# Patient Record
Sex: MALE | NOT HISPANIC OR LATINO | Employment: OTHER | URBAN - METROPOLITAN AREA
[De-identification: names, ages, dates, MRNs, and addresses within clinical notes are randomized per-mention and may not be internally consistent; named-entity substitution may affect disease eponyms.]

---

## 2017-06-20 ENCOUNTER — LAB (OUTPATIENT)
Dept: LAB | Facility: HOSPITAL | Age: 52
End: 2017-06-20

## 2017-06-20 ENCOUNTER — TRANSCRIBE ORDERS (OUTPATIENT)
Dept: LAB | Facility: HOSPITAL | Age: 52
End: 2017-06-20

## 2017-06-20 DIAGNOSIS — T84.89XA: ICD-10-CM

## 2017-06-20 DIAGNOSIS — T84.89XA: Primary | ICD-10-CM

## 2017-06-20 LAB
ALBUMIN SERPL-MCNC: 4 G/DL (ref 3.2–4.8)
ALBUMIN/GLOB SERPL: 1.3 G/DL (ref 1.5–2.5)
ALP SERPL-CCNC: 246 U/L (ref 25–100)
ALT SERPL W P-5'-P-CCNC: 75 U/L (ref 7–40)
ANION GAP SERPL CALCULATED.3IONS-SCNC: 8 MMOL/L (ref 3–11)
AST SERPL-CCNC: 73 U/L (ref 0–33)
BASOPHILS # BLD AUTO: 0.04 10*3/MM3 (ref 0–0.2)
BASOPHILS NFR BLD AUTO: 0.9 % (ref 0–1)
BILIRUB SERPL-MCNC: 0.4 MG/DL (ref 0.3–1.2)
BUN BLD-MCNC: 12 MG/DL (ref 9–23)
BUN/CREAT SERPL: 13.3 (ref 7–25)
CALCIUM SPEC-SCNC: 9.7 MG/DL (ref 8.7–10.4)
CHLORIDE SERPL-SCNC: 106 MMOL/L (ref 99–109)
CO2 SERPL-SCNC: 29 MMOL/L (ref 20–31)
CREAT BLD-MCNC: 0.9 MG/DL (ref 0.6–1.3)
CRP SERPL-MCNC: 0.22 MG/DL (ref 0–1)
DEPRECATED RDW RBC AUTO: 45.7 FL (ref 37–54)
EOSINOPHIL # BLD AUTO: 0.21 10*3/MM3 (ref 0.1–0.3)
EOSINOPHIL NFR BLD AUTO: 4.6 % (ref 0–3)
ERYTHROCYTE [DISTWIDTH] IN BLOOD BY AUTOMATED COUNT: 12.6 % (ref 11.3–14.5)
ERYTHROCYTE [SEDIMENTATION RATE] IN BLOOD: 18 MM/HR (ref 0–20)
GFR SERPL CREATININE-BSD FRML MDRD: 107 ML/MIN/1.73
GFR SERPL CREATININE-BSD FRML MDRD: 88 ML/MIN/1.73
GLOBULIN UR ELPH-MCNC: 3.2 GM/DL
GLUCOSE BLD-MCNC: 108 MG/DL (ref 70–100)
HCT VFR BLD AUTO: 45 % (ref 38.9–50.9)
HGB BLD-MCNC: 14.7 G/DL (ref 13.1–17.5)
IMM GRANULOCYTES # BLD: 0.01 10*3/MM3 (ref 0–0.03)
IMM GRANULOCYTES NFR BLD: 0.2 % (ref 0–0.6)
LYMPHOCYTES # BLD AUTO: 1.96 10*3/MM3 (ref 0.6–4.8)
LYMPHOCYTES NFR BLD AUTO: 43.1 % (ref 24–44)
MCH RBC QN AUTO: 32.2 PG (ref 27–31)
MCHC RBC AUTO-ENTMCNC: 32.7 G/DL (ref 32–36)
MCV RBC AUTO: 98.7 FL (ref 80–99)
MONOCYTES # BLD AUTO: 0.5 10*3/MM3 (ref 0–1)
MONOCYTES NFR BLD AUTO: 11 % (ref 0–12)
NEUTROPHILS # BLD AUTO: 1.83 10*3/MM3 (ref 1.5–8.3)
NEUTROPHILS NFR BLD AUTO: 40.2 % (ref 41–71)
PLATELET # BLD AUTO: 177 10*3/MM3 (ref 150–450)
PMV BLD AUTO: 11.8 FL (ref 6–12)
POTASSIUM BLD-SCNC: 4.6 MMOL/L (ref 3.5–5.5)
PROT SERPL-MCNC: 7.2 G/DL (ref 5.7–8.2)
RBC # BLD AUTO: 4.56 10*6/MM3 (ref 4.2–5.76)
SODIUM BLD-SCNC: 143 MMOL/L (ref 132–146)
WBC NRBC COR # BLD: 4.55 10*3/MM3 (ref 3.5–10.8)

## 2017-06-20 PROCEDURE — 36415 COLL VENOUS BLD VENIPUNCTURE: CPT

## 2017-06-20 PROCEDURE — 80053 COMPREHEN METABOLIC PANEL: CPT | Performed by: ORTHOPAEDIC SURGERY

## 2017-06-20 PROCEDURE — 82495 ASSAY OF CHROMIUM: CPT | Performed by: ORTHOPAEDIC SURGERY

## 2017-06-20 PROCEDURE — 85652 RBC SED RATE AUTOMATED: CPT | Performed by: ORTHOPAEDIC SURGERY

## 2017-06-20 PROCEDURE — 85025 COMPLETE CBC W/AUTO DIFF WBC: CPT | Performed by: ORTHOPAEDIC SURGERY

## 2017-06-20 PROCEDURE — 83018 HEAVY METAL QUAN EACH NES: CPT | Performed by: ORTHOPAEDIC SURGERY

## 2017-06-20 PROCEDURE — 86140 C-REACTIVE PROTEIN: CPT | Performed by: ORTHOPAEDIC SURGERY

## 2017-06-22 LAB — COBALT SERPL-MCNC: 3.4 UG/L (ref 0–0.9)

## 2017-06-23 LAB — CR SERPL-MCNC: 5.1 UG/L (ref 0.1–2.1)

## 2017-08-29 ENCOUNTER — HOSPITAL ENCOUNTER (OUTPATIENT)
Dept: GENERAL RADIOLOGY | Facility: HOSPITAL | Age: 52
Discharge: HOME | End: 2017-08-29
Admitting: ORTHOPAEDIC SURGERY

## 2017-08-29 ENCOUNTER — APPOINTMENT (OUTPATIENT)
Dept: PREADMISSION TESTING | Facility: HOSPITAL | Age: 52
End: 2017-08-29

## 2017-08-29 VITALS — BODY MASS INDEX: 32.52 KG/M2 | HEIGHT: 64 IN | WEIGHT: 190.48 LBS

## 2017-08-29 DIAGNOSIS — Z01.89 LABORATORY TEST: Primary | ICD-10-CM

## 2017-08-29 LAB
ABO GROUP BLD: NORMAL
ANION GAP SERPL CALCULATED.3IONS-SCNC: 6 MMOL/L (ref 3–11)
BUN BLD-MCNC: 17 MG/DL (ref 9–23)
BUN/CREAT SERPL: 21.3 (ref 7–25)
CALCIUM SPEC-SCNC: 9.3 MG/DL (ref 8.7–10.4)
CHLORIDE SERPL-SCNC: 107 MMOL/L (ref 99–109)
CO2 SERPL-SCNC: 28 MMOL/L (ref 20–31)
CREAT BLD-MCNC: 0.8 MG/DL (ref 0.6–1.3)
DEPRECATED RDW RBC AUTO: 48.2 FL (ref 37–54)
ERYTHROCYTE [DISTWIDTH] IN BLOOD BY AUTOMATED COUNT: 13.5 % (ref 11.3–14.5)
GFR SERPL CREATININE-BSD FRML MDRD: 101 ML/MIN/1.73
GFR SERPL CREATININE-BSD FRML MDRD: 123 ML/MIN/1.73
GLUCOSE BLD-MCNC: 137 MG/DL (ref 70–100)
HBA1C MFR BLD: 6 % (ref 4.8–5.6)
HCT VFR BLD AUTO: 42.6 % (ref 38.9–50.9)
HGB BLD-MCNC: 14.4 G/DL (ref 13.1–17.5)
MCH RBC QN AUTO: 33 PG (ref 27–31)
MCHC RBC AUTO-ENTMCNC: 33.8 G/DL (ref 32–36)
MCV RBC AUTO: 97.5 FL (ref 80–99)
PLATELET # BLD AUTO: 199 10*3/MM3 (ref 150–450)
PMV BLD AUTO: 10.8 FL (ref 6–12)
POTASSIUM BLD-SCNC: 4 MMOL/L (ref 3.5–5.5)
RBC # BLD AUTO: 4.37 10*6/MM3 (ref 4.2–5.76)
RH BLD: POSITIVE
SODIUM BLD-SCNC: 141 MMOL/L (ref 132–146)
WBC NRBC COR # BLD: 6.22 10*3/MM3 (ref 3.5–10.8)

## 2017-08-29 PROCEDURE — 93005 ELECTROCARDIOGRAM TRACING: CPT

## 2017-08-29 PROCEDURE — 83036 HEMOGLOBIN GLYCOSYLATED A1C: CPT | Performed by: ORTHOPAEDIC SURGERY

## 2017-08-29 PROCEDURE — 36415 COLL VENOUS BLD VENIPUNCTURE: CPT

## 2017-08-29 PROCEDURE — 85027 COMPLETE CBC AUTOMATED: CPT | Performed by: ORTHOPAEDIC SURGERY

## 2017-08-29 PROCEDURE — 93010 ELECTROCARDIOGRAM REPORT: CPT | Performed by: INTERNAL MEDICINE

## 2017-08-29 PROCEDURE — 86901 BLOOD TYPING SEROLOGIC RH(D): CPT

## 2017-08-29 PROCEDURE — 80048 BASIC METABOLIC PNL TOTAL CA: CPT | Performed by: ORTHOPAEDIC SURGERY

## 2017-08-29 PROCEDURE — 86900 BLOOD TYPING SEROLOGIC ABO: CPT

## 2017-08-29 PROCEDURE — 71020 HC CHEST PA AND LATERAL: CPT

## 2017-08-29 RX ORDER — HYDROCODONE BITARTRATE AND ACETAMINOPHEN 7.5; 325 MG/1; MG/1
1 TABLET ORAL 2 TIMES DAILY PRN
COMMUNITY
End: 2017-09-12 | Stop reason: HOSPADM

## 2017-09-10 ENCOUNTER — ANESTHESIA EVENT (OUTPATIENT)
Dept: PERIOP | Facility: HOSPITAL | Age: 52
End: 2017-09-10

## 2017-09-11 ENCOUNTER — ANESTHESIA (OUTPATIENT)
Dept: PERIOP | Facility: HOSPITAL | Age: 52
End: 2017-09-11

## 2017-09-11 ENCOUNTER — APPOINTMENT (OUTPATIENT)
Dept: GENERAL RADIOLOGY | Facility: HOSPITAL | Age: 52
End: 2017-09-11

## 2017-09-11 ENCOUNTER — HOSPITAL ENCOUNTER (INPATIENT)
Facility: HOSPITAL | Age: 52
LOS: 1 days | Discharge: HOME HEALTH | End: 2017-09-12
Attending: ORTHOPAEDIC SURGERY | Admitting: ORTHOPAEDIC SURGERY

## 2017-09-11 DIAGNOSIS — Z78.9 IMPAIRED MOBILITY AND ADLS: ICD-10-CM

## 2017-09-11 DIAGNOSIS — Z74.09 IMPAIRED MOBILITY AND ADLS: ICD-10-CM

## 2017-09-11 DIAGNOSIS — M25.551 HIP PAIN, RIGHT: ICD-10-CM

## 2017-09-11 DIAGNOSIS — Z74.09 IMPAIRED FUNCTIONAL MOBILITY, BALANCE, GAIT, AND ENDURANCE: Primary | ICD-10-CM

## 2017-09-11 DIAGNOSIS — Z96.641 STATUS POST TOTAL REPLACEMENT OF RIGHT HIP: ICD-10-CM

## 2017-09-11 PROBLEM — K21.9 GERD (GASTROESOPHAGEAL REFLUX DISEASE): Status: ACTIVE | Noted: 2017-09-11

## 2017-09-11 PROBLEM — Z96.649 PAIN DUE TO TOTAL HIP REPLACEMENT (HCC): Status: ACTIVE | Noted: 2017-09-11

## 2017-09-11 PROBLEM — Z72.0 TOBACCO ABUSE: Status: ACTIVE | Noted: 2017-09-11

## 2017-09-11 PROBLEM — R73.03 PREDIABETES: Status: ACTIVE | Noted: 2017-09-11

## 2017-09-11 PROBLEM — T84.84XA PAIN DUE TO TOTAL HIP REPLACEMENT (HCC): Status: ACTIVE | Noted: 2017-09-11

## 2017-09-11 LAB
ABO GROUP BLD: NORMAL
BLD GP AB SCN SERPL QL: NEGATIVE
GLUCOSE BLDC GLUCOMTR-MCNC: 105 MG/DL (ref 70–130)
GLUCOSE BLDC GLUCOMTR-MCNC: 126 MG/DL (ref 70–130)
GLUCOSE BLDC GLUCOMTR-MCNC: 161 MG/DL (ref 70–130)
POTASSIUM BLDA-SCNC: 3.94 MMOL/L (ref 3.5–5.3)
RH BLD: POSITIVE

## 2017-09-11 PROCEDURE — 87176 TISSUE HOMOGENIZATION CULTR: CPT | Performed by: ORTHOPAEDIC SURGERY

## 2017-09-11 PROCEDURE — 84132 ASSAY OF SERUM POTASSIUM: CPT | Performed by: ANESTHESIOLOGY

## 2017-09-11 PROCEDURE — 86850 RBC ANTIBODY SCREEN: CPT | Performed by: ORTHOPAEDIC SURGERY

## 2017-09-11 PROCEDURE — 82962 GLUCOSE BLOOD TEST: CPT

## 2017-09-11 PROCEDURE — 97161 PT EVAL LOW COMPLEX 20 MIN: CPT

## 2017-09-11 PROCEDURE — 0SPA0JZ REMOVAL OF SYNTHETIC SUBSTITUTE FROM RIGHT HIP JOINT, ACETABULAR SURFACE, OPEN APPROACH: ICD-10-PCS | Performed by: ORTHOPAEDIC SURGERY

## 2017-09-11 PROCEDURE — 97116 GAIT TRAINING THERAPY: CPT

## 2017-09-11 PROCEDURE — 25010000002 ROPIVACAINE PER 1 MG: Performed by: ORTHOPAEDIC SURGERY

## 2017-09-11 PROCEDURE — 25010000002 HYDROMORPHONE PER 4 MG: Performed by: INTERNAL MEDICINE

## 2017-09-11 PROCEDURE — 25010000002 FENTANYL CITRATE (PF) 100 MCG/2ML SOLUTION: Performed by: NURSE ANESTHETIST, CERTIFIED REGISTERED

## 2017-09-11 PROCEDURE — 87075 CULTR BACTERIA EXCEPT BLOOD: CPT | Performed by: ORTHOPAEDIC SURGERY

## 2017-09-11 PROCEDURE — 25010000003 CEFAZOLIN IN DEXTROSE 2-4 GM/100ML-% SOLUTION: Performed by: ORTHOPAEDIC SURGERY

## 2017-09-11 PROCEDURE — 25010000002 EPINEPHRINE PER 0.1 MG: Performed by: ORTHOPAEDIC SURGERY

## 2017-09-11 PROCEDURE — 87070 CULTURE OTHR SPECIMN AEROBIC: CPT | Performed by: ORTHOPAEDIC SURGERY

## 2017-09-11 PROCEDURE — 25010000002 PROPOFOL 1000 MG/ML EMULSION: Performed by: NURSE ANESTHETIST, CERTIFIED REGISTERED

## 2017-09-11 PROCEDURE — 87116 MYCOBACTERIA CULTURE: CPT | Performed by: ORTHOPAEDIC SURGERY

## 2017-09-11 PROCEDURE — 87205 SMEAR GRAM STAIN: CPT | Performed by: ORTHOPAEDIC SURGERY

## 2017-09-11 PROCEDURE — 73502 X-RAY EXAM HIP UNI 2-3 VIEWS: CPT

## 2017-09-11 PROCEDURE — 87206 SMEAR FLUORESCENT/ACID STAI: CPT | Performed by: ORTHOPAEDIC SURGERY

## 2017-09-11 PROCEDURE — 86923 COMPATIBILITY TEST ELECTRIC: CPT

## 2017-09-11 PROCEDURE — C1776 JOINT DEVICE (IMPLANTABLE): HCPCS | Performed by: ORTHOPAEDIC SURGERY

## 2017-09-11 PROCEDURE — 0SRA00Z REPLACEMENT OF RIGHT HIP JOINT, ACETABULAR SURFACE WITH POLYETHYLENE SYNTHETIC SUBSTITUTE, OPEN APPROACH: ICD-10-PCS | Performed by: ORTHOPAEDIC SURGERY

## 2017-09-11 PROCEDURE — 25010000002 ONDANSETRON PER 1 MG: Performed by: NURSE PRACTITIONER

## 2017-09-11 PROCEDURE — 86901 BLOOD TYPING SEROLOGIC RH(D): CPT | Performed by: ORTHOPAEDIC SURGERY

## 2017-09-11 PROCEDURE — 87102 FUNGUS ISOLATION CULTURE: CPT | Performed by: ORTHOPAEDIC SURGERY

## 2017-09-11 PROCEDURE — 25010000002 KETOROLAC TROMETHAMINE PER 15 MG: Performed by: ORTHOPAEDIC SURGERY

## 2017-09-11 PROCEDURE — 86900 BLOOD TYPING SEROLOGIC ABO: CPT | Performed by: ORTHOPAEDIC SURGERY

## 2017-09-11 PROCEDURE — G0108 DIAB MANAGE TRN  PER INDIV: HCPCS

## 2017-09-11 DEVICE — M-SPEC METAL FEMORAL HEAD 12/14 TAPER DIAMETER 36MM +1.5: Type: IMPLANTABLE DEVICE | Status: FUNCTIONAL

## 2017-09-11 DEVICE — PINNACLE HIP SOLUTIONS ALTRX POLYETHYLENE ACETABULAR LINER NEUTRAL 36MM ID 54MM OD
Type: IMPLANTABLE DEVICE | Status: FUNCTIONAL
Brand: PINNACLE ALTRX

## 2017-09-11 RX ORDER — SODIUM CHLORIDE 0.9 % (FLUSH) 0.9 %
1-10 SYRINGE (ML) INJECTION AS NEEDED
Status: DISCONTINUED | OUTPATIENT
Start: 2017-09-11 | End: 2017-09-12 | Stop reason: HOSPADM

## 2017-09-11 RX ORDER — FAMOTIDINE 10 MG/ML
20 INJECTION, SOLUTION INTRAVENOUS ONCE
Status: DISCONTINUED | OUTPATIENT
Start: 2017-09-11 | End: 2017-09-11 | Stop reason: HOSPADM

## 2017-09-11 RX ORDER — HYDROMORPHONE HYDROCHLORIDE 1 MG/ML
0.5 INJECTION, SOLUTION INTRAMUSCULAR; INTRAVENOUS; SUBCUTANEOUS
Status: DISCONTINUED | OUTPATIENT
Start: 2017-09-11 | End: 2017-09-12 | Stop reason: HOSPADM

## 2017-09-11 RX ORDER — ONDANSETRON 2 MG/ML
4 INJECTION INTRAMUSCULAR; INTRAVENOUS EVERY 6 HOURS PRN
Status: DISCONTINUED | OUTPATIENT
Start: 2017-09-11 | End: 2017-09-12 | Stop reason: HOSPADM

## 2017-09-11 RX ORDER — FAMOTIDINE 20 MG/1
20 TABLET, FILM COATED ORAL ONCE
Status: COMPLETED | OUTPATIENT
Start: 2017-09-11 | End: 2017-09-11

## 2017-09-11 RX ORDER — CEFAZOLIN SODIUM 2 G/100ML
2 INJECTION, SOLUTION INTRAVENOUS ONCE
Status: COMPLETED | OUTPATIENT
Start: 2017-09-11 | End: 2017-09-11

## 2017-09-11 RX ORDER — DEXTROSE MONOHYDRATE 25 G/50ML
25 INJECTION, SOLUTION INTRAVENOUS
Status: DISCONTINUED | OUTPATIENT
Start: 2017-09-11 | End: 2017-09-12 | Stop reason: HOSPADM

## 2017-09-11 RX ORDER — NICOTINE POLACRILEX 4 MG
15 LOZENGE BUCCAL
Status: DISCONTINUED | OUTPATIENT
Start: 2017-09-11 | End: 2017-09-12 | Stop reason: HOSPADM

## 2017-09-11 RX ORDER — LIDOCAINE HYDROCHLORIDE 10 MG/ML
INJECTION, SOLUTION INFILTRATION; PERINEURAL AS NEEDED
Status: DISCONTINUED | OUTPATIENT
Start: 2017-09-11 | End: 2017-09-11 | Stop reason: SURG

## 2017-09-11 RX ORDER — LIDOCAINE HYDROCHLORIDE 10 MG/ML
0.5 INJECTION, SOLUTION EPIDURAL; INFILTRATION; INTRACAUDAL; PERINEURAL ONCE AS NEEDED
Status: DISCONTINUED | OUTPATIENT
Start: 2017-09-11 | End: 2017-09-11 | Stop reason: HOSPADM

## 2017-09-11 RX ORDER — FENTANYL CITRATE 50 UG/ML
50 INJECTION, SOLUTION INTRAMUSCULAR; INTRAVENOUS
Status: DISCONTINUED | OUTPATIENT
Start: 2017-09-11 | End: 2017-09-11 | Stop reason: HOSPADM

## 2017-09-11 RX ORDER — DOCUSATE SODIUM 100 MG/1
100 CAPSULE, LIQUID FILLED ORAL 2 TIMES DAILY
Status: DISCONTINUED | OUTPATIENT
Start: 2017-09-11 | End: 2017-09-12 | Stop reason: HOSPADM

## 2017-09-11 RX ORDER — MAGNESIUM HYDROXIDE 1200 MG/15ML
LIQUID ORAL AS NEEDED
Status: DISCONTINUED | OUTPATIENT
Start: 2017-09-11 | End: 2017-09-11 | Stop reason: HOSPADM

## 2017-09-11 RX ORDER — ROPIVACAINE HYDROCHLORIDE 5 MG/ML
INJECTION, SOLUTION EPIDURAL; INFILTRATION; PERINEURAL AS NEEDED
Status: DISCONTINUED | OUTPATIENT
Start: 2017-09-11 | End: 2017-09-11 | Stop reason: HOSPADM

## 2017-09-11 RX ORDER — LIDOCAINE HYDROCHLORIDE 10 MG/ML
0.5 INJECTION, SOLUTION EPIDURAL; INFILTRATION; INTRACAUDAL; PERINEURAL ONCE AS NEEDED
Status: COMPLETED | OUTPATIENT
Start: 2017-09-11 | End: 2017-09-11

## 2017-09-11 RX ORDER — OXYCODONE HYDROCHLORIDE AND ACETAMINOPHEN 5; 325 MG/1; MG/1
1 TABLET ORAL EVERY 4 HOURS PRN
Status: DISCONTINUED | OUTPATIENT
Start: 2017-09-11 | End: 2017-09-12 | Stop reason: HOSPADM

## 2017-09-11 RX ORDER — OXYCODONE HYDROCHLORIDE AND ACETAMINOPHEN 5; 325 MG/1; MG/1
2 TABLET ORAL EVERY 4 HOURS PRN
Status: DISCONTINUED | OUTPATIENT
Start: 2017-09-11 | End: 2017-09-12 | Stop reason: HOSPADM

## 2017-09-11 RX ORDER — TRANEXAMIC ACID 100 MG/ML
10 INJECTION, SOLUTION INTRAVENOUS ONCE
Status: DISCONTINUED | OUTPATIENT
Start: 2017-09-11 | End: 2017-09-11 | Stop reason: HOSPADM

## 2017-09-11 RX ORDER — SODIUM CHLORIDE, SODIUM LACTATE, POTASSIUM CHLORIDE, CALCIUM CHLORIDE 600; 310; 30; 20 MG/100ML; MG/100ML; MG/100ML; MG/100ML
9 INJECTION, SOLUTION INTRAVENOUS CONTINUOUS
Status: DISCONTINUED | OUTPATIENT
Start: 2017-09-11 | End: 2017-09-12 | Stop reason: HOSPADM

## 2017-09-11 RX ORDER — HYDROMORPHONE HYDROCHLORIDE 1 MG/ML
0.5 INJECTION, SOLUTION INTRAMUSCULAR; INTRAVENOUS; SUBCUTANEOUS
Status: DISCONTINUED | OUTPATIENT
Start: 2017-09-11 | End: 2017-09-11 | Stop reason: HOSPADM

## 2017-09-11 RX ORDER — CEFAZOLIN SODIUM 2 G/100ML
2 INJECTION, SOLUTION INTRAVENOUS EVERY 8 HOURS
Status: COMPLETED | OUTPATIENT
Start: 2017-09-11 | End: 2017-09-12

## 2017-09-11 RX ORDER — ASPIRIN 325 MG
325 TABLET, DELAYED RELEASE (ENTERIC COATED) ORAL DAILY
Status: DISCONTINUED | OUTPATIENT
Start: 2017-09-12 | End: 2017-09-12 | Stop reason: HOSPADM

## 2017-09-11 RX ORDER — SODIUM CHLORIDE 9 MG/ML
150 INJECTION, SOLUTION INTRAVENOUS CONTINUOUS
Status: DISCONTINUED | OUTPATIENT
Start: 2017-09-11 | End: 2017-09-12 | Stop reason: HOSPADM

## 2017-09-11 RX ORDER — ACETAMINOPHEN 325 MG/1
650 TABLET ORAL EVERY 4 HOURS PRN
Status: DISCONTINUED | OUTPATIENT
Start: 2017-09-11 | End: 2017-09-12 | Stop reason: HOSPADM

## 2017-09-11 RX ORDER — BUPIVACAINE HYDROCHLORIDE 5 MG/ML
INJECTION, SOLUTION EPIDURAL; INTRACAUDAL AS NEEDED
Status: DISCONTINUED | OUTPATIENT
Start: 2017-09-11 | End: 2017-09-11 | Stop reason: SURG

## 2017-09-11 RX ORDER — FAMOTIDINE 20 MG/1
20 TABLET, FILM COATED ORAL ONCE
Status: DISCONTINUED | OUTPATIENT
Start: 2017-09-11 | End: 2017-09-11 | Stop reason: HOSPADM

## 2017-09-11 RX ORDER — SODIUM CHLORIDE 0.9 % (FLUSH) 0.9 %
1-10 SYRINGE (ML) INJECTION AS NEEDED
Status: DISCONTINUED | OUTPATIENT
Start: 2017-09-11 | End: 2017-09-11 | Stop reason: HOSPADM

## 2017-09-11 RX ORDER — HYDROCODONE BITARTRATE AND ACETAMINOPHEN 5; 325 MG/1; MG/1
1 TABLET ORAL EVERY 4 HOURS PRN
Status: DISCONTINUED | OUTPATIENT
Start: 2017-09-11 | End: 2017-09-11

## 2017-09-11 RX ORDER — FENTANYL CITRATE 50 UG/ML
INJECTION, SOLUTION INTRAMUSCULAR; INTRAVENOUS AS NEEDED
Status: DISCONTINUED | OUTPATIENT
Start: 2017-09-11 | End: 2017-09-11 | Stop reason: SURG

## 2017-09-11 RX ORDER — PANTOPRAZOLE SODIUM 40 MG/1
40 TABLET, DELAYED RELEASE ORAL DAILY
Status: DISCONTINUED | OUTPATIENT
Start: 2017-09-11 | End: 2017-09-12 | Stop reason: HOSPADM

## 2017-09-11 RX ORDER — KETOROLAC TROMETHAMINE 15 MG/ML
15 INJECTION, SOLUTION INTRAMUSCULAR; INTRAVENOUS EVERY 6 HOURS PRN
Status: DISCONTINUED | OUTPATIENT
Start: 2017-09-11 | End: 2017-09-12 | Stop reason: HOSPADM

## 2017-09-11 RX ORDER — BISACODYL 5 MG/1
10 TABLET, DELAYED RELEASE ORAL DAILY PRN
Status: DISCONTINUED | OUTPATIENT
Start: 2017-09-11 | End: 2017-09-12 | Stop reason: HOSPADM

## 2017-09-11 RX ORDER — NICOTINE 21 MG/24HR
1 PATCH, TRANSDERMAL 24 HOURS TRANSDERMAL EVERY 24 HOURS
Status: DISCONTINUED | OUTPATIENT
Start: 2017-09-11 | End: 2017-09-12 | Stop reason: HOSPADM

## 2017-09-11 RX ORDER — BISACODYL 10 MG
10 SUPPOSITORY, RECTAL RECTAL DAILY PRN
Status: DISCONTINUED | OUTPATIENT
Start: 2017-09-11 | End: 2017-09-12 | Stop reason: HOSPADM

## 2017-09-11 RX ORDER — PANTOPRAZOLE SODIUM 20 MG/1
20 TABLET, DELAYED RELEASE ORAL DAILY
COMMUNITY

## 2017-09-11 RX ORDER — HYDRALAZINE HYDROCHLORIDE 20 MG/ML
10 INJECTION INTRAMUSCULAR; INTRAVENOUS EVERY 6 HOURS PRN
Status: DISCONTINUED | OUTPATIENT
Start: 2017-09-11 | End: 2017-09-12 | Stop reason: HOSPADM

## 2017-09-11 RX ORDER — ONDANSETRON 2 MG/ML
4 INJECTION INTRAMUSCULAR; INTRAVENOUS ONCE AS NEEDED
Status: DISCONTINUED | OUTPATIENT
Start: 2017-09-11 | End: 2017-09-11 | Stop reason: HOSPADM

## 2017-09-11 RX ADMIN — LIDOCAINE HYDROCHLORIDE 50 MG: 10 INJECTION, SOLUTION INFILTRATION; PERINEURAL at 10:35

## 2017-09-11 RX ADMIN — DOCUSATE SODIUM 100 MG: 100 CAPSULE, LIQUID FILLED ORAL at 17:33

## 2017-09-11 RX ADMIN — FENTANYL CITRATE 100 MCG: 50 INJECTION, SOLUTION INTRAMUSCULAR; INTRAVENOUS at 10:25

## 2017-09-11 RX ADMIN — SODIUM CHLORIDE, POTASSIUM CHLORIDE, SODIUM LACTATE AND CALCIUM CHLORIDE 9 ML/HR: 600; 310; 30; 20 INJECTION, SOLUTION INTRAVENOUS at 08:07

## 2017-09-11 RX ADMIN — HYDROCODONE BITARTRATE AND ACETAMINOPHEN 1 TABLET: 5; 325 TABLET ORAL at 14:26

## 2017-09-11 RX ADMIN — LIDOCAINE HYDROCHLORIDE 0.5 ML: 10 INJECTION, SOLUTION EPIDURAL; INFILTRATION; INTRACAUDAL; PERINEURAL at 08:07

## 2017-09-11 RX ADMIN — CEFAZOLIN SODIUM 2 G: 2 INJECTION, SOLUTION INTRAVENOUS at 17:33

## 2017-09-11 RX ADMIN — HYDROMORPHONE HYDROCHLORIDE 0.5 MG: 1 INJECTION, SOLUTION INTRAMUSCULAR; INTRAVENOUS; SUBCUTANEOUS at 15:04

## 2017-09-11 RX ADMIN — SODIUM CHLORIDE 150 ML/HR: 9 INJECTION, SOLUTION INTRAVENOUS at 12:47

## 2017-09-11 RX ADMIN — SODIUM CHLORIDE 150 ML/HR: 9 INJECTION, SOLUTION INTRAVENOUS at 20:02

## 2017-09-11 RX ADMIN — KETOROLAC TROMETHAMINE 15 MG: 15 INJECTION, SOLUTION INTRAMUSCULAR; INTRAVENOUS at 16:01

## 2017-09-11 RX ADMIN — EPHEDRINE SULFATE 10 MG: 50 INJECTION INTRAMUSCULAR; INTRAVENOUS; SUBCUTANEOUS at 11:01

## 2017-09-11 RX ADMIN — OXYCODONE AND ACETAMINOPHEN 2 TABLET: 5; 325 TABLET ORAL at 22:08

## 2017-09-11 RX ADMIN — PROPOFOL 150 MCG/KG/MIN: 10 INJECTION, EMULSION INTRAVENOUS at 10:35

## 2017-09-11 RX ADMIN — CEFAZOLIN SODIUM 2 G: 2 INJECTION, SOLUTION INTRAVENOUS at 10:21

## 2017-09-11 RX ADMIN — OXYCODONE AND ACETAMINOPHEN 2 TABLET: 5; 325 TABLET ORAL at 17:38

## 2017-09-11 RX ADMIN — ONDANSETRON 4 MG: 2 INJECTION INTRAMUSCULAR; INTRAVENOUS at 15:42

## 2017-09-11 RX ADMIN — SODIUM CHLORIDE, POTASSIUM CHLORIDE, SODIUM LACTATE AND CALCIUM CHLORIDE: 600; 310; 30; 20 INJECTION, SOLUTION INTRAVENOUS at 10:20

## 2017-09-11 RX ADMIN — TRANEXAMIC ACID 864 MG: 100 INJECTION, SOLUTION INTRAVENOUS at 11:46

## 2017-09-11 RX ADMIN — HYDROMORPHONE HYDROCHLORIDE 0.5 MG: 1 INJECTION, SOLUTION INTRAMUSCULAR; INTRAVENOUS; SUBCUTANEOUS at 20:02

## 2017-09-11 RX ADMIN — BUPIVACAINE HYDROCHLORIDE 15 MG: 5 INJECTION, SOLUTION EPIDURAL; INTRACAUDAL; PERINEURAL at 10:30

## 2017-09-11 RX ADMIN — FAMOTIDINE 20 MG: 20 TABLET ORAL at 08:07

## 2017-09-11 RX ADMIN — EPHEDRINE SULFATE 10 MG: 50 INJECTION INTRAMUSCULAR; INTRAVENOUS; SUBCUTANEOUS at 11:26

## 2017-09-11 RX ADMIN — TRANEXAMIC ACID 864 MG: 100 INJECTION, SOLUTION INTRAVENOUS at 10:49

## 2017-09-11 NOTE — PLAN OF CARE
Problem: Patient Care Overview (Adult)  Goal: Plan of Care Review  Outcome: Ongoing (interventions implemented as appropriate)    09/11/17 1642   Coping/Psychosocial Response Interventions   Plan Of Care Reviewed With patient;spouse   Patient Care Overview   Progress no change   Outcome Evaluation   Outcome Summary/Follow up Plan 51 yo male admitted 9/11/17 for right hip revision. Issues w/ pain and nausea, using prn medication to resolve. Decreased sensation r/t spinal anesthesia causing some incontinence-resolving as sensation returns. Eager to work w/ therapy, wife present and very interactive in care. Anticipate home w/ family when medically ready.

## 2017-09-11 NOTE — THERAPY EVALUATION
Acute Care - Physical Therapy Initial Evaluation  [facility]     Patient Name: Ezekiel Reyes  : 1965  MRN: [id number]  Today's Date: 2017   Onset of Illness/Injury or Date of Surgery Date: 17  Date of Referral to PT: 17  Referring Physician: MD Silverio      Admit Date: 2017     Visit Dx:    ICD-10-CM ICD-9-CM   1. Impaired functional mobility, balance, gait, and endurance Z74.09 V49.89   2. Hip pain, right M25.551 719.45     Patient Active Problem List   Diagnosis   • Pain due to total hip replacement   • Status post revision total replacement of right hip   • GERD (gastroesophageal reflux disease)   • Prediabetes   • Tobacco abuse     Past Medical History:   Diagnosis Date   • Acid reflux    • Arthritis    • Wears dentures    • Wears glasses      Past Surgical History:   Procedure Laterality Date   • CARDIAC CATHETERIZATION     • CHOLECYSTECTOMY     • COLONOSCOPY     • JOINT REPLACEMENT Right     Trochanteric nailing then removal with Right FRANKO   • JOINT REPLACEMENT Left    • SHOULDER SURGERY            PT ASSESSMENT (last 72 hours)      PT Evaluation       17 1620 17 0809    Rehab Evaluation    Document Type evaluation  -LR     Subjective Information agree to therapy;complains of;pain  -LR     Patient Effort, Rehab Treatment excellent  -LR     Symptoms Noted During/After Treatment none  -LR     General Information    Patient Profile Review yes  -LR     Onset of Illness/Injury or Date of Surgery Date 17  -LR     Referring Physician MD Silverio  -LR     General Observations Patient supine in bed upon arrival. IV, SCDs. Wife present.   -LR     Pertinent History Of Current Problem Patient presents for surgical management of persistent and progressive R hip pain and dysfunction, s/p R FRANKO and intertrochanteric nail removal in .   -LR     Precautions/Limitations fall precautions;hip precautions- right;other (see comments)   impulsive  -LR     Prior Level of Function min  assist:;all household mobility;community mobility;gait;transfer;bed mobility;home management;cooking;cleaning;shopping;using stairs;independent:;driving;yard work   all mobility limited by pain  -LR     Equipment Currently Used at Home none  -LR none  -DB    Plans/Goals Discussed With patient;spouse/S.O.;agreed upon  -LR     Risks Reviewed patient:;spouse/S.O.:;LOB;nausea/vomiting;dizziness;increased discomfort;lines disloged  -LR     Benefits Reviewed patient:;spouse/S.O.:;improve function;increase independence;increase strength;increase balance;decrease pain;increase knowledge  -LR     Barriers to Rehab previous functional deficit  -LR     Living Environment    Lives With spouse;child(malena), dependent  -LR spouse;child(malena), dependent;child(malena), adult  -DB    Living Arrangements house  -LR house  -DB    Home Accessibility bed and bath on same level;house is not wheelchair accessible;stairs to enter home;tub/shower is not walk in  -LR no concerns  -DB    Number of Stairs to Enter Home 4   3 steps onto porch, 1 step into home  -LR 7  -DB    Number of Stairs Within Home --   to basement, does not have to access   -LR 15  -DB    Stair Railings at Home outside, present on right side  -LR outside, present at both sides  -DB    Type of Financial/Environmental Concern none  -LR none  -DB    Transportation Available family or friend will provide  -LR family or friend will provide;public transportation  -DB    Living Environment Comment Patient's wife available to assist for the first week, then has to return to work.   -LR na  -DB    Clinical Impression    Date of Referral to PT 09/11/17  -LR     PT Diagnosis s/p R FRANKO revision  -LR     Prognosis good  -LR     Patient/Family Goals Statement go home, decrease pain  -LR     Criteria for Skilled Therapeutic Interventions Met yes;treatment indicated  -LR     Rehab Potential good, to achieve stated therapy goals  -LR     Pain Assessment    Pain Assessment 0-10  -LR     Pain  Score 10  -LR     Post Pain Score 10  -LR     Pain Type Acute pain  -LR     Pain Location Hip  -LR     Pain Orientation Right  -LR     Pain Intervention(s) Repositioned;Ambulation/increased activity  -LR     Vision Assessment/Intervention    Visual Impairment WFL  -LR     Cognitive Assessment/Intervention    Current Cognitive/Communication Assessment functional  -LR     Orientation Status oriented x 4;required verbal cueing (specifiy in comments)  -LR     Follows Commands/Answers Questions 75% of the time;able to follow single-step instructions;needs cueing;needs repetition   decreased safety awareness.   -LR     Personal Safety mild impairment;at risk behaviors demonstrated;decreased awareness, need for assist;decreased awareness, need for safety;decreased insight to deficits;impulsive  -LR     ROM (Range of Motion)    General ROM lower extremity range of motion deficits identified  -LR     General LE Assessment    ROM LLE ROM was WFL;hip, right: LE ROM deficit  -LR     ROM Detail R hip AROM impaired 25%  -LR     MMT (Manual Muscle Testing)    General MMT Assessment lower extremity strength deficits identified  -LR     Lower Extremity    Lower Ext Manual Muscle Testing left LE strength is WFL;right hip strength deficit  -LR     Lower Ext Manual Muscle Testing Detail R hip functionally 4-/5  -LR     Mobility Assessment/Training    Extremity Weight-Bearing Status right lower extremity  -LR     Right Lower Extremity Weight-Bearing weight-bearing as tolerated  -LR     Bed Mobility, Assessment/Treatment    Bed Mobility, Assistive Device head of bed elevated;bed rails  -LR     Bed Mob, Supine to Sit, [place] supervision required  -LR     Bed Mob, Sit to Supine, [place] not tested   UIC at end of eval.   -LR     Bed Mobility, Impairments ROM decreased;strength decreased;pain  -LR     Transfer Assessment/Treatment    Transfers, Sit-Stand [place] verbal cues required;stand by assist  -LR     Transfers,  Stand-Sit [place] verbal cues required;stand by assist  -LR     Transfers, Sit-Stand-Sit, Assist Device rolling walker  -LR     Transfer, Safety Issues sequencing ability decreased;step length decreased;weight-shifting ability decreased  -LR     Transfer, Impairments ROM decreased;strength decreased;pain  -LR     Transfer, Comment Verbal cues for correct hand placement with t/f and to step L LE out before t/f for comfort.   -LR     Gait Assessment/Treatment    Gait, [place] Level verbal cues required;contact guard assist;2 person assist required  -LR     Gait, Assistive Device rolling walker  -LR     Gait, Distance (Feet) 500  -LR     Gait, Gait Pattern Analysis swing-through gait  -LR     Gait, Gait Deviations right:;antalgic;toe-to-floor clearance decreased  -LR     Gait, Safety Issues weight-shifting ability decreased  -LR     Gait, Impairments ROM decreased;strength decreased;pain  -LR     Gait, Comment Patient ambulated with step through gait pattern at fast pace. Verbal cues for increased R knee flexion during swing phase. Improved with cues for correction. Gait limited by pain.   -LR     Therapy Exercises    Exercise Protocols total hip   revision  -LR     Total Hip Exercises right:;10 reps;ankle pumps/circles  -LR     Sensory Assessment/Intervention    Sensory Impairment --   denies numbness/tingling; actively DF bilaterally  -LR     Positioning and Restraints    Pre-Treatment Position in bed  -LR     Post Treatment Position chair  -LR     In Chair notified nsg;reclined;sitting;call light within reach;encouraged to call for assist;with family/caregiver;legs elevated  -LR       User Key  (r) = Recorded By, (t) = Taken By, (c) = Cosigned By    Initials Name Provider Type    LR Becca Enrique, WANG Physical Therapist    JARON Spann, RN Registered Nurse          Physical Therapy Education     Title: PT OT SLP Therapies (Done)     Topic: Physical Therapy (Done)     Point: Mobility training  (Done)    Learning Progress Summary    Learner Readiness Method Response Comment Documented by Status   Patient Acceptance E,D VU,NR Educated on hip precautions and safety with mobility. LR 09/11/17 1703 Done               Point: Home exercise program (Done)    Learning Progress Summary    Learner Readiness Method Response Comment Documented by Status   Patient Acceptance E,D VU,NR Educated on hip precautions and safety with mobility. LR 09/11/17 1703 Done               Point: Body mechanics (Done)    Learning Progress Summary    Learner Readiness Method Response Comment Documented by Status   Patient Acceptance E,D VU,NR Educated on hip precautions and safety with mobility. LR 09/11/17 1703 Done               Point: Precautions (Done)    Learning Progress Summary    Learner Readiness Method Response Comment Documented by Status   Patient Acceptance E,D VU,NR Educated on hip precautions and safety with mobility. LR 09/11/17 1703 Done                      User Key     Initials Effective Dates Name Provider Type Discipline    LR 06/19/15 -  Becca Enrique, PT Physical Therapist PT                PT Recommendation and Plan  Anticipated Equipment Needs At Discharge: front wheeled walker, bedside commode, tub bench, standard cane  Anticipated Discharge Disposition: home with assist, home with home health  Planned Therapy Interventions: balance training, bed mobility training, gait training, home exercise program, patient/family education, ROM (Range of Motion), stair training, strengthening, transfer training  PT Frequency: 2 times/day  Plan of Care Review  Plan Of Care Reviewed With: patient, spouse  Progress: progress toward functional goals as expected  Outcome Summary/Follow up Plan: Patient ambulated 500 feet with RW and CGA, only required SBA for t/f and bed mobility. Plan is d/c home tomorrow. Will progress mobility and strength as able, progress to stair training in AM.           IP PT Goals       09/11/17  1620          Bed Mobility PT LTG    Bed Mobility PT LTG, Date Established 09/11/17  -LR      Bed Mobility PT LTG, Time to Achieve 5 days  -LR      Bed Mobility PT LTG, Activity Type supine to sit/sit to supine  -LR      Bed Mobility PT LTG, [place] Level conditional independence  -LR      Bed Mobility PT LTG, Date Goal Reviewed 09/11/17  -LR      Bed Mobility PT LTG, Outcome goal ongoing  -LR      Transfer Training PT LTG    Transfer Training PT LTG, Date Established 09/11/17  -LR      Transfer Training PT LTG, Time to Achieve 5 days  -LR      Transfer Training PT LTG, Activity Type sit to stand/stand to sit  -LR      Transfer Training PT LTG, [place] Level conditional independence  -LR      Transfer Training PT LTG, Assist Device walker, rolling  -LR      Transfer Training PT  LTG, Date Goal Reviewed 09/11/17  -LR      Transfer Training PT LTG, Outcome goal ongoing  -LR      Gait Training PT LTG    Gait Training Goal PT LTG, Date Established 09/11/17  -LR      Gait Training Goal PT LTG, Time to Achieve 5 days  -LR      Gait Training Goal PT LTG, [place] Level conditional independence  -LR      Gait Training Goal PT LTG, Assist Device walker, rolling  -LR      Gait Training Goal PT LTG, Distance to Achieve 750 feet  -LR      Gait Training Goal PT LTG, Date Goal Reviewed 09/11/17  -LR      Gait Training Goal PT LTG, Outcome goal ongoing  -LR      Stair Training PT STG    Stair Training Goal PT STG, Date Established 09/11/17  -LR      Stair Training Goal PT STG, Time to Achieve 5 days  -LR      Stair Training Goal PT STG, Number of Steps 1  -LR      Stair Training Goal PT STG, [place] Level conditional independence  -LR      Stair Training Goal PT STG, Assist Device walker, rolling   backwards  -LR      Stair Training Goal PT STG, Date Goal Reviewed 09/11/17  -LR      Stair Training Goal PT STG, Outcome goal ongoing  -LR      Stair Training PT LTG    Stair Training Goal PT LTG, Date Established  09/11/17  -LR      Stair Training Goal PT LTG, Time to Achieve 5 days  -LR      Stair Training Goal PT LTG, Number of Steps 3  -LR      Stair Training Goal PT LTG, [place] Level contact guard assist  -LR      Stair Training Goal PT LTG, Assist Device 1 handrail;cane, straight  -LR      Stair Training Goal PT LTG, Date Goal Reviewed 09/11/17  -LR      Stair Training Goal PT LTG, Outcome goal ongoing  -LR        User Key  (r) = Recorded By, (t) = Taken By, (c) = Cosigned By    Initials Name Provider Type    BARTOLO Enrique PT Physical Therapist                Outcome Measures       09/11/17 1620          How much help from another person do you currently need...    Turning from your back to your side while in flat bed without using bedrails? 3  -LR      Moving from lying on back to sitting on the side of a flat bed without bedrails? 3  -LR      Moving to and from a bed to a chair (including a wheelchair)? 3  -LR      Standing up from a chair using your arms (e.g., wheelchair, bedside chair)? 3  -LR      Climbing 3-5 steps with a railing? 3  -LR      To walk in hospital room? 3  -LR      AM-PAC 6 Clicks Score 18  -LR      Functional Assessment    Outcome Measure Options AM-PAC 6 Clicks Basic Mobility (PT)  -LR        User Key  (r) = Recorded By, (t) = Taken By, (c) = Cosigned By    Initials Name Provider Type    BARTOLO Enrique PT Physical Therapist           Time Calculation:         PT Charges       09/11/17 1710          Time Calculation    Start Time 1620  -LR      PT Received On 09/11/17  -LR      PT Goal Re-Cert Due Date 09/21/17  -LR      Time Calculation- PT    Total Timed Code Minutes- PT 15 minute(s)  -LR        User Key  (r) = Recorded By, (t) = Taken By, (c) = Cosigned By    Initials Name Provider Type    BARTOLO Enrique PT Physical Therapist          Therapy Charges for Today     Code Description Service Date Service Provider Modifiers Qty    32623863391 HC GAIT TRAINING  EA 15 MIN 9/11/2017 Becca Enrique, PT GP 1    42875042219 HC PT THER SUPP EA 15 MIN 9/11/2017 Becca Enrique, PT GP 2    05304743319 HC PT EVAL LOW COMPLEXITY 3 9/11/2017 Becca Enrique, PT GP 1          PT G-Codes  Outcome Measure Options: AM-PAC 6 Clicks Basic Mobility (PT)      Becca Enrique, PT  9/11/2017

## 2017-09-11 NOTE — OP NOTE
TOTAL HIP ARTHROPLASTY ANTERIOR REVISION  Procedure Note    Ezekiel Reyes  9/11/2017    Pre-op Diagnosis:   Right hip painful total hip arthroplasty    Post-op Diagnosis:     Right hip painful total hip arthroplasty  Heterotopic ossification    Procedure/CPT® Codes:  16971 - Exchange to Polyethylene liner  Local excision heterotopic ossification    Procedure(s):  TOTAL HIP ARTHROPLASTY ANTERIOR REVISON RIGHT     Surgeon(s):  Julio Sawyer MD    Anesthesia: General with Block    Staff:   Circulator: Lianet Schwarz RN; Moy Gannon RN  Scrub Person: Erica Linton  Nursing Assistant: Kam Ham  Assistant: Treasure Larson CSA    Estimated Blood Loss: 200 mL  Urine Voided: * No values recorded between 9/11/2017 10:16 AM and 9/11/2017 12:13 PM *    Specimens:                  ID Type Source Tests Collected by Time Destination   1 : RIGHT HIP JOINT SYNOVITUS Synovium Hip, Right ANAEROBIC CULTURE, FUNGAL CULTURE, TISSUE/BONE CULTURE, AFB CULTURE Julio Sawyer MD 9/11/2017 1128          Drains:           Findings: H.O. External capsule resected   No gross instability or obvious inflammation   Soft tissue between acetabular implant and liner sent for culture   Stable acetabular and femoral components    Complications: None    Implants:   Out: Depuy Carson 54/36 +2/neutral liner Metal-on-metal   In: Depuy [place] 54/36 neutral/neutral polyethylene liner   Replaced: +1.5/36 neck/head adapter   Maintained: Stable Carson acetabular shell and AML stem    Indications: 52-year-old with long history of right hip issues. He describes avascular necrosis treated with core decompression approximately 10 years ago. This was complicated by intertrochanteric fracture and nail fixation. Several years ago this was removed and a right total hip replacement was placed. There is overall improvement in his symptoms but he continues to have popping sensation and pain in the right hip. X-rays show possible osteolysis  proximally around the fully coated stem. Metal on metal acetabular liner is in place. There is no gross osteolytic process. Risks and benefits indications and rationale for revision total hip arthroplasty including any variation of acetabular liner exchange and acetabular or femoral component revision discussed with patient to wishes to proceed with surgery for pain relief. He signs his own consent after all questions are answered.    Procedure: While in the OR spinal anesthesia was started. Satisfactory level was achieved. Turned to the right side up lateral decubitus position and prepped and draped in standard fashion with the right leg free. Old posterior incision was reentered. This allowed for adequate direct lateral approach to the hip capsule. Heterotopic ossification exterior to the capsule was identified and resected. A T-shaped capsulotomy created. Minimal effusion identified. Femoral head was reasonably stable dislocated anteriorly and removed from the trunnion. Ultimately thorough evaluation of the femur showed no gross lytic process and no significant loosening and overall adequate position. Circumferential capsular release performed around acetabular component. Metal on metal acetabular liner was removed without difficulty. Soft tissue between the liner and acetabular shell was sent for culture. Trial showed full range of motion and good stability with the neutral neutral poly liner and the +1.5/36 neck and head adapter which was the same size removed. Wound was then thoroughly irrigated. Polyethylene placed without difficulty. Femoral head placed also without difficulty and reduced and showed same range of motion and stability as with the trial. Wound was then thoroughly irrigated and closed in layers without a drain.  Standard Prineo dressing applied. Patient stable to recovery having tolerated procedure well throughout with all counts correct.      Julio Sawyer MD     Date: 9/11/2017  Time: 12:18  PM

## 2017-09-11 NOTE — PLAN OF CARE
Problem: Patient Care Overview (Adult)  Goal: Plan of Care Review  Outcome: Ongoing (interventions implemented as appropriate)    09/11/17 1620   Coping/Psychosocial Response Interventions   Plan Of Care Reviewed With patient;spouse   Patient Care Overview   Progress progress toward functional goals as expected   Outcome Evaluation   Outcome Summary/Follow up Plan Patient ambulated 500 feet with RW and CGA, only required SBA for t/f and bed mobility. Plan is d/c home tomorrow. Will progress mobility and strength as able, progress to stair training in AM.          Problem: Hip Replacement, Total (Adult)  Goal: Signs and Symptoms of Listed Potential Problems Will be Absent or Manageable (Hip Replacement, Total)  Outcome: Ongoing (interventions implemented as appropriate)    09/11/17 1620   Hip Replacement, Total   Problems Assessed (Total Hip Replacement) pain;displacement of prosthesis;functional decline/self care deficit   Problems Present (Total Hip Replacement) pain;displacement of prosthesis;functional decline/self care deficit         Problem: Inpatient Physical Therapy  Goal: Bed Mobility Goal LTG- PT  Outcome: Ongoing (interventions implemented as appropriate)    09/11/17 1620   Bed Mobility PT LTG   Bed Mobility PT LTG, Date Established 09/11/17   Bed Mobility PT LTG, Time to Achieve 5 days   Bed Mobility PT LTG, Activity Type supine to sit/sit to supine   Bed Mobility PT LTG, [place] Level conditional independence   Bed Mobility PT LTG, Date Goal Reviewed 09/11/17   Bed Mobility PT LTG, Outcome goal ongoing       Goal: Transfer Training Goal 1 LTG- PT  Outcome: Ongoing (interventions implemented as appropriate)    09/11/17 1620   Transfer Training PT LTG   Transfer Training PT LTG, Date Established 09/11/17   Transfer Training PT LTG, Time to Achieve 5 days   Transfer Training PT LTG, Activity Type sit to stand/stand to sit   Transfer Training PT LTG, [place] Level conditional independence    Transfer Training PT LTG, Assist Device walker, rolling   Transfer Training PT LTG, Date Goal Reviewed 09/11/17   Transfer Training PT LTG, Outcome goal ongoing       Goal: Gait Training Goal LTG- PT  Outcome: Ongoing (interventions implemented as appropriate)    09/11/17 1620   Gait Training PT LTG   Gait Training Goal PT LTG, Date Established 09/11/17   Gait Training Goal PT LTG, Time to Achieve 5 days   Gait Training Goal PT LTG, [place] Level conditional independence   Gait Training Goal PT LTG, Assist Device walker, rolling   Gait Training Goal PT LTG, Distance to Achieve 750 feet   Gait Training Goal PT LTG, Date Goal Reviewed 09/11/17   Gait Training Goal PT LTG, Outcome goal ongoing       Goal: Stair Training Goal STG- PT  Outcome: Ongoing (interventions implemented as appropriate)    09/11/17 1620   Stair Training PT STG   Stair Training Goal PT STG, Date Established 09/11/17   Stair Training Goal PT STG, Time to Achieve 5 days   Stair Training Goal PT STG, Number of Steps 1   Stair Training Goal PT STG, [place] Level conditional independence   Stair Training Goal PT STG, Assist Device walker, rolling  (backwards)   Stair Training Goal PT STG, Date Goal Reviewed 09/11/17   Stair Training Goal PT STG, Outcome goal ongoing       Goal: Stair Training Goal LTG- PT  Outcome: Ongoing (interventions implemented as appropriate)    09/11/17 1620   Stair Training PT LTG   Stair Training Goal PT LTG, Date Established 09/11/17   Stair Training Goal PT LTG, Time to Achieve 5 days   Stair Training Goal PT LTG, Number of Steps 3   Stair Training Goal PT LTG, [place] Level contact guard assist   Stair Training Goal PT LTG, Assist Device 1 handrail;cane, straight   Stair Training Goal PT LTG, Date Goal Reviewed 09/11/17   Stair Training Goal PT LTG, Outcome goal ongoing

## 2017-09-11 NOTE — ANESTHESIA PREPROCEDURE EVALUATION
Anesthesia Evaluation     Patient summary reviewed and Nursing notes reviewed   NPO Solid Status: > 8 hours  NPO Liquid Status: > 8 hours     Airway   Mallampati: I  TM distance: >3 FB  Neck ROM: full  Dental    (+) lower dentures and upper dentures    Pulmonary     breath sounds clear to auscultation  Cardiovascular     ECG reviewed  Rhythm: regular  Rate: normal        Neuro/Psych  GI/Hepatic/Renal/Endo    (+)  GERD,     Musculoskeletal     Abdominal    Substance History      OB/GYN          Other   (+) arthritis     ROS/Med Hx Other: Chronic LBP                                   Anesthesia Plan    ASA 2     spinal   (I discussed with pt risks/ benefits of SAB and He understands and wishes to proceed)  intravenous induction   Anesthetic plan and risks discussed with patient and spouse/significant other.    Plan discussed with CRNA.

## 2017-09-11 NOTE — ANESTHESIA POSTPROCEDURE EVALUATION
Patient: Ezekiel Reyes    Procedure Summary     Date Anesthesia Start Anesthesia Stop Room / Location    09/11/17 1021  [facility] / [facility]       Procedure Diagnosis Surgeon Provider    TOTAL HIP ARTHROPLASTY ANTERIOR REVISON RIGHT  (Right Hip) No diagnosis on file. MD Matteo Gregg MD          Anesthesia Type: spinal  Last vitals  BP        Temp        Pulse       Resp        SpO2          Post Anesthesia Care and Evaluation    Patient location during evaluation: PACU  Patient participation: complete - patient participated  Level of consciousness: awake and alert  Pain score: 0  Pain management: adequate  Airway patency: patent  Anesthetic complications: No anesthetic complications  PONV Status: none  Cardiovascular status: hemodynamically stable and acceptable  Respiratory status: nonlabored ventilation, acceptable and nasal cannula  Hydration status: acceptable    Comments: /82 (BP Location: Right arm, Patient Position: Lying)  Pulse 82  Resp 14  SpO2 99% Temp: 97.8

## 2017-09-11 NOTE — H&P
Pre-Op H&P  Ezekiel Reyes  [id number]  1965    Chief complaint: Right hip pain    HPI:    Patient is a 52 y.o.male presents with pain due to total right hip replacement and here today for right hip revision arthroplasty anterior.  S/P Trochanteric nail of right hip and then underwent removal of trochanteric nail of right hip and right total hip replacement 6/2007.    Review of Systems:  General ROS: negative for chills, fever or skin lesions;  No changes since last office visit  Cardiovascular ROS: no chest pain or dyspnea on exertion  Respiratory ROS: no cough, shortness of breath, or wheezing    Allergies: No Known Allergies    Home Meds:    Prescriptions Prior to Admission   Medication Sig Dispense Refill Last Dose   • HYDROcodone-acetaminophen (NORCO) 7.5-325 MG per tablet Take 1 tablet by mouth 2 (Two) Times a Day As Needed for Moderate Pain .          PMH:   Past Medical History:   Diagnosis Date   • Acid reflux    • Arthritis    • Wears dentures    • Wears glasses      PSH:    Past Surgical History:   Procedure Laterality Date   • CARDIAC CATHETERIZATION     • CHOLECYSTECTOMY     • COLONOSCOPY     • JOINT REPLACEMENT      Right hip replaced    • SHOULDER SURGERY     Left hip replacement    Immunization History:  Influenza: no  Pneumococcal: yes 2016  Tetanus: unknown    Social History:   Tobacco:   History   Smoking Status   • Current Every Day Smoker   • Packs/day: 0.25   • Years: 3.00   • Types: Cigarettes   Smokeless Tobacco   • Never Used      Alcohol:     History   Alcohol Use No       Vitals:           /98 (BP Location: Right arm, Patient Position: Lying)  Pulse 68  Resp 14  SpO2 97%    Physical Exam:  General Appearance:    Alert, cooperative, no distress, appears stated age   Head:    Normocephalic, without obvious abnormality, atraumatic   Lungs:     Clear to auscultation bilaterally, respirations unlabored    Heart:   Regular rate and rhythm, S1 and S2 normal, no murmur, rub    or  gallop    Abdomen:    Soft, non-tender.  +bowel sounds   Breast Exam:    deferred   Genitalia:    deferred   Extremities:   Extremities normal, atraumatic, no cyanosis or edema   Skin:   Skin color, texture, turgor normal, no rashes or lesions   Neurologic:   Grossly intact   Results Review  I reviewed the patient's new clinical results.    Cancer Staging (if applicable)  Cancer Patient: __ yes _x_no __unknown; If yes, clinical stage T:__ N:__M:__, stage group or __N/A    Impression/Plan:  Pain due to total right hip replacement and here today for right hip revision arthroplasty anterior.  S/P Trochanteric nail of right hip and then underwent removal of trochanteric nail of right hip and right total hip replacement 6/2007.      Chelo Ugalde, APRN   9/11/2017   8:16 AM

## 2017-09-11 NOTE — H&P
Patient Name: Ezekiel Reyes  MRN: [id number]  : 1965  DOS: 2017    Attending: Julio Sawyer MD    Primary Care Provider: Julio Swayer MD      Chief complaint:  Right hip pain    Subjective   Patient is a 52 y.o. male presented for revision of right total hip arthroplasty by Dr. Sawyer under [place]. He tolerated surgery well and is admitted for further medical management.    Per 's note:   ( 52-year-old with long history of right hip issues. He describes avascular necrosis treated with core decompression approximately 10 years ago. This was complicated by intertrochanteric fracture and nail fixation. Several years ago this was removed and a right total hip replacement was placed.  There is overall improvement in his symptoms but he continues to have popping sensation and pain in the right hip. X-rays show possible osteolysis proximally around the fully coated stem. Metal on metal acetabular liner is in place. There is no gross osteolytic process. Risks and benefits indications and rationale for revision total hip arthroplasty including any variation of acetabular liner exchange and acetabular or femoral component revision discussed with patient to wishes to proceed with surgery for pain relief. He signs his own consent after all questions are answered.)     (When seen in his room afterwards, he c/o postop pain. Though he is still under effect of spinal anesthesia. He reports being chronically on Lortab and that it doesn't seem to be helping. Prefers to take percocet for pain control. No f/c/n/vom/sob. No cp /orthopnea/pnd. No LE swelling.  wy)    Allergies:  No Known Allergies    Meds:  Prescriptions Prior to Admission   Medication Sig Dispense Refill Last Dose   • pantoprazole (PROTONIX) 20 MG EC tablet Take 20 mg by mouth Daily.      • HYDROcodone-acetaminophen (NORCO) 7.5-325 MG per tablet Take 1 tablet by mouth 2 (Two) Times a Day As Needed for Moderate Pain .          History:   Past Medical  "History:   Diagnosis Date   • Acid reflux    • Arthritis    • Wears dentures    • Wears glasses      Past Surgical History:   Procedure Laterality Date   • CARDIAC CATHETERIZATION     • CHOLECYSTECTOMY     • COLONOSCOPY     • JOINT REPLACEMENT Right     Trochanteric nailing then removal with Right FRANKO   • JOINT REPLACEMENT Left    • SHOULDER SURGERY       History reviewed. No pertinent family history.  Social History   Substance Use Topics   • Smoking status: Current Every Day Smoker     Packs/day: 0.25     Years: 3.00     Types: Cigarettes   • Smokeless tobacco: Never Used   • Alcohol use No    for 2 years. Has 3 children from before. Disabled due to chronic back pain.     Review of Systems  Pertinent items are noted in HPI    Vital Signs  /88 (BP Location: Right arm, Patient Position: Lying)  Pulse 71  Temp 96.8 °F (36 °C) (Oral)   Resp 16  Ht 64\" (162.6 cm)  Wt 190 lb (86.2 kg)  SpO2 100%  BMI 32.61 kg/m2    Physical Exam:    General Appearance:    Alert, cooperative, in no acute distress   Head:    Normocephalic, without obvious abnormality, atraumatic   Eyes:            Lids and lashes normal, conjunctivae and sclerae normal, no   icterus, no pallor, corneas clear    Ears:    Ears appear intact with no abnormalities noted   Neck:   No adenopathy, supple, trachea midline, no thyromegaly   Lungs:     Clear to auscultation,respirations regular, even and                   unlabored    Heart:    Regular rhythm and normal rate, normal S1 and S2, no            Murmur    Abdomen:     Normal bowel sounds, no masses, no organomegaly, soft        non-tender, non-distended, no guarding, no rebound                 tenderness   Genitalia:    Deferred   Extremities:   Long lateral right hip incision, CDI with prineo on , no edema, no cyanosis, no  redness   Pulses:   Pulses palpable and equal bilaterally   Skin:   No bleeding, bruising or rash   Neurologic:   Cranial nerves 2 - 12 grossly intact, LE with " decreased motor and sensory function due to SA effect.       I reviewed the patient's new clinical results.     Results for SHEY SAUCEDO (MRN [id number]) as of 9/11/2017 14:23   Ref. Range 8/29/2017 09:18   Glucose Latest Ref Range: 70 - 100 mg/dL 137 (H)   Sodium Latest Ref Range: 132 - 146 mmol/L 141   Potassium Latest Ref Range: 3.5 - 5.5 mmol/L 4.0   CO2 Latest Ref Range: 20.0 - 31.0 mmol/L 28.0   Chloride Latest Ref Range: 99 - 109 mmol/L 107   Anion Gap Latest Ref Range: 3.0 - 11.0 mmol/L 6.0   Creatinine Latest Ref Range: 0.60 - 1.30 mg/dL 0.80   BUN Latest Ref Range: 9 - 23 mg/dL 17   BUN/Creatinine Ratio Latest Ref Range: 7.0 - 25.0  21.3   Calcium Latest Ref Range: 8.7 - 10.4 mg/dL 9.3   eGFR Non African Amer Latest Ref Range: >60 mL/min/1.73 101   eGFR  African Amer Latest Ref Range: >60 mL/min/1.73 123   Hemoglobin A1C Latest Ref Range: 4.80 - 5.60 % 6.00 (H)   WBC Latest Ref Range: 3.50 - 10.80 10*3/mm3 6.22   RBC Latest Ref Range: 4.20 - 5.76 10*6/mm3 4.37   Hemoglobin Latest Ref Range: 13.1 - 17.5 g/dL 14.4   Hematocrit Latest Ref Range: 38.9 - 50.9 % 42.6   RDW Latest Ref Range: 11.3 - 14.5 % 13.5   MCV Latest Ref Range: 80.0 - 99.0 fL 97.5   MCH Latest Ref Range: 27.0 - 31.0 pg 33.0 (H)   MCHC Latest Ref Range: 32.0 - 36.0 g/dL 33.8   MPV Latest Ref Range: 6.0 - 12.0 fL 10.8   Platelets Latest Ref Range: 150 - 450 10*3/mm3 199     Assessment and Plan:   Principal Problem:    Status post revision total replacement of right hip  Active Problems:    Pain due to total hip replacement    GERD (gastroesophageal reflux disease)    Prediabetes    Tobacco abuse      Plan  1. PT/OT- early ambulation post op  2. Pain control-prns   3. IS-encourage  4. DVT proph- mechs/ASA  5. Bowel regimen  6. Resume home medications as appropriate  7. Monitor post-op labs  8. DC planning     GERD  - Continue home protonix  Tobacco abuse  - Nicotine patch daily  PreDM  - hgb A1c on 8/29/17 6.0  - Accuchecks ACHS with low  dose SSI  - DM educator consult    Will change PO prn pain med to percocet. wy    Seen and examined by me. Agree with above. Discussed with patient.     Cipriano Davalos MD  09/11/17  2:52 PM

## 2017-09-11 NOTE — ANESTHESIA PROCEDURE NOTES
Spinal Block    Patient location during procedure: OR  Start Time: 9/11/2017 10:35 AM  Indication:at surgeon's request  Performed By  CRNA: DEVON MCGARRY  Preanesthetic Checklist  Completed: patient identified, site marked, surgical consent, pre-op evaluation, timeout performed, IV checked, risks and benefits discussed and monitors and equipment checked  Spinal Block Prep:  Patient Position:sitting  Sterile Tech:cap, gloves, sterile barriers and mask  Prep:Chloraprep  Patient Monitoring:blood pressure monitoring, continuous pulse oximetry and EKG  Spinal Block Procedure  Approach:midline  Guidance:landmark technique and palpation technique  Location:L4-L5  Needle Type:Terry  Needle Gauge:25 G  Placement of Spinal needle event:cerebrospinal fluid aspirated    Fluid Appearance:clear  Post Assessment  Patient Tolerance:patient tolerated the procedure well with no apparent complications  Complications no  Additional Notes  Procedure:  Pt assisted to sitting position, with legs in position of comfort over side of bed.  Pt. instructed in optimal spine presentation, the spine was prepped/ Draped and the skin at insertion site was anesthetized with 1% Lidocaine 2 ml.  The spinal needle was then advanced until CSF flow was obtained and LA was injected:      Marcaine 0.5% PSF injected 15 Mg.

## 2017-09-11 NOTE — CONSULTS
"Diabetes Education  Assessment/Teaching    Patient Name:  Ezekiel Reyes  YOB: 1965  MRN: [id number]  Admit Date:  9/11/2017      Assessment Date:  9/11/2017       Most Recent Value    General Information      Referral From:  A1c, Blood glucose, MD order    Height  5' 4\" (1.626 m)    Weight  190 lb (86.2 kg)    Pregnancy Assessment     Diabetes History     What type of diabetes do you have?  Pre-diabetes    Length of Diabetes Diagnosis  Newly diagnosed <6 months    Current DM knowledge  poor    Have you had diabetes education/teaching in the past?  no    Do you test your blood sugar at home?  no    Education Preferences     What areas of diabetes would you like to learn about?  avoiding high blood sugar, diabetes complications, diet information, exercise information, understanding diabetes, testing my blood sugar at home, stress/coping skills, resources on diabetes    Nutrition Information     What is the biggest challenge you have with your diet?  Poor choices, Knowledge    Assessment Topics     Healthy Eating - Assessment  Needs education    Being Active - Assessment  Needs education    Taking Medication - Assessment  Needs education    Problem Solving - Assessment  Needs education    Reducing Risk - Assessment  Needs education    Healthy Coping - Assessment  Needs education    Monitoring - Assessment  Needs education    DM Goals     Healthy Eating - Goal  0-7 days from discharge    Being Active - Goal  0-30 days from discharge    Taking Medication - Goal  0-7 days from discharge    Problem Solving - Goal  0-7 days from discharge    Reducing Risk - Goal  0-7 days from discharge    Healthy Coping - Goal  0-7 days from discharge    Monitoring - Goal  0-7 days from discharge               Most Recent Value    DM Education Needs     Meter  Needs meter    Meter Type  Contour    Frequency of Testing  3 times a week    Blood Glucose Target Range  <100    Reducing Risks  A1C testing, Blood pressure, Eye " exam, Immunizations, Cardiovascular    Healthy Eating  Reviewed meal plan    Physical Activity  Walking    Physical Activity Frequency  Regularly    Healthy Coping  Anxious    Motivation  Moderate    Teaching Method  Explanation, Discussion, Handouts, Teach back    Patient Response  Verbalized understanding            Other Comments:    Pt stated from start that he is not a diabetic and that there are no doctors where he lives. Wife at bedside and was more interested in learning more. Patient educated on Pre-diabetes, diabetes and the disease process, types of DM, diagnosis/A1C, monitoring, signs and symptoms, activity and exercise and how to prevent disease from progressing. Changing behavior and goal setting relative to diet, exercise and healthy lifestyle was emphasized. Patient provided a new donated contour next meter and advised to contact PCP for prescription for lancets and strips. Pt. verbalized understanding and  advised to consult with PCP for further instructions, discuss A1C result, and POC. Education handouts provided, questions answered and pt. advised to call with any other questions or concerns.        Electronically signed by:  Hieu Arteaga RN  09/11/17 3:49 PM

## 2017-09-12 VITALS
SYSTOLIC BLOOD PRESSURE: 133 MMHG | BODY MASS INDEX: 32.44 KG/M2 | HEART RATE: 83 BPM | RESPIRATION RATE: 16 BRPM | WEIGHT: 190 LBS | OXYGEN SATURATION: 96 % | DIASTOLIC BLOOD PRESSURE: 78 MMHG | TEMPERATURE: 98.3 F | HEIGHT: 64 IN

## 2017-09-12 LAB
ABO + RH BLD: NORMAL
ABO + RH BLD: NORMAL
ANION GAP SERPL CALCULATED.3IONS-SCNC: 2 MMOL/L (ref 3–11)
BASOPHILS # BLD AUTO: 0.02 10*3/MM3 (ref 0–0.2)
BASOPHILS NFR BLD AUTO: 0.4 % (ref 0–1)
BH BB BLOOD EXPIRATION DATE: NORMAL
BH BB BLOOD EXPIRATION DATE: NORMAL
BH BB BLOOD TYPE BARCODE: 5100
BH BB BLOOD TYPE BARCODE: 5100
BH BB DISPENSE STATUS: NORMAL
BH BB DISPENSE STATUS: NORMAL
BH BB PRODUCT CODE: NORMAL
BH BB PRODUCT CODE: NORMAL
BH BB UNIT NUMBER: NORMAL
BH BB UNIT NUMBER: NORMAL
BUN BLD-MCNC: 9 MG/DL (ref 9–23)
BUN/CREAT SERPL: 12.9 (ref 7–25)
CALCIUM SPEC-SCNC: 8.7 MG/DL (ref 8.7–10.4)
CHLORIDE SERPL-SCNC: 106 MMOL/L (ref 99–109)
CO2 SERPL-SCNC: 29 MMOL/L (ref 20–31)
CREAT BLD-MCNC: 0.7 MG/DL (ref 0.6–1.3)
DEPRECATED RDW RBC AUTO: 47.7 FL (ref 37–54)
EOSINOPHIL # BLD AUTO: 0.3 10*3/MM3 (ref 0–0.3)
EOSINOPHIL NFR BLD AUTO: 5.4 % (ref 0–3)
ERYTHROCYTE [DISTWIDTH] IN BLOOD BY AUTOMATED COUNT: 13.4 % (ref 11.3–14.5)
GFR SERPL CREATININE-BSD FRML MDRD: 118 ML/MIN/1.73
GFR SERPL CREATININE-BSD FRML MDRD: 143 ML/MIN/1.73
GLUCOSE BLD-MCNC: 188 MG/DL (ref 70–100)
GLUCOSE BLDC GLUCOMTR-MCNC: 127 MG/DL (ref 70–130)
GLUCOSE BLDC GLUCOMTR-MCNC: 131 MG/DL (ref 70–130)
HCT VFR BLD AUTO: 41.9 % (ref 38.9–50.9)
HGB BLD-MCNC: 13.8 G/DL (ref 13.1–17.5)
IMM GRANULOCYTES # BLD: 0.01 10*3/MM3 (ref 0–0.03)
IMM GRANULOCYTES NFR BLD: 0.2 % (ref 0–0.6)
LYMPHOCYTES # BLD AUTO: 0.54 10*3/MM3 (ref 0.6–4.8)
LYMPHOCYTES NFR BLD AUTO: 9.7 % (ref 24–44)
MCH RBC QN AUTO: 32.2 PG (ref 27–31)
MCHC RBC AUTO-ENTMCNC: 32.9 G/DL (ref 32–36)
MCV RBC AUTO: 97.7 FL (ref 80–99)
MONOCYTES # BLD AUTO: 0.42 10*3/MM3 (ref 0–1)
MONOCYTES NFR BLD AUTO: 7.5 % (ref 0–12)
NEUTROPHILS # BLD AUTO: 4.29 10*3/MM3 (ref 1.5–8.3)
NEUTROPHILS NFR BLD AUTO: 76.8 % (ref 41–71)
PLATELET # BLD AUTO: 129 10*3/MM3 (ref 150–450)
PMV BLD AUTO: 11.5 FL (ref 6–12)
POTASSIUM BLD-SCNC: 4 MMOL/L (ref 3.5–5.5)
RBC # BLD AUTO: 4.29 10*6/MM3 (ref 4.2–5.76)
SODIUM BLD-SCNC: 137 MMOL/L (ref 132–146)
UNIT  ABO: NORMAL
UNIT  ABO: NORMAL
UNIT  RH: NORMAL
UNIT  RH: NORMAL
WBC NRBC COR # BLD: 5.58 10*3/MM3 (ref 3.5–10.8)

## 2017-09-12 PROCEDURE — 97116 GAIT TRAINING THERAPY: CPT

## 2017-09-12 PROCEDURE — 82962 GLUCOSE BLOOD TEST: CPT

## 2017-09-12 PROCEDURE — 25010000002 HYDROMORPHONE PER 4 MG: Performed by: INTERNAL MEDICINE

## 2017-09-12 PROCEDURE — 25010000003 CEFAZOLIN IN DEXTROSE 2-4 GM/100ML-% SOLUTION: Performed by: ORTHOPAEDIC SURGERY

## 2017-09-12 PROCEDURE — 90674 CCIIV4 VAC NO PRSV 0.5 ML IM: CPT | Performed by: INTERNAL MEDICINE

## 2017-09-12 PROCEDURE — 97165 OT EVAL LOW COMPLEX 30 MIN: CPT | Performed by: OCCUPATIONAL THERAPIST

## 2017-09-12 PROCEDURE — G0008 ADMIN INFLUENZA VIRUS VAC: HCPCS | Performed by: INTERNAL MEDICINE

## 2017-09-12 PROCEDURE — 25010000002 KETOROLAC TROMETHAMINE PER 15 MG: Performed by: ORTHOPAEDIC SURGERY

## 2017-09-12 PROCEDURE — 97530 THERAPEUTIC ACTIVITIES: CPT | Performed by: OCCUPATIONAL THERAPIST

## 2017-09-12 PROCEDURE — 25010000002 INFLUENZA VAC SPLIT QUAD 0.5 ML SUSPENSION PREFILLED SYRINGE: Performed by: INTERNAL MEDICINE

## 2017-09-12 PROCEDURE — 85025 COMPLETE CBC W/AUTO DIFF WBC: CPT | Performed by: ORTHOPAEDIC SURGERY

## 2017-09-12 PROCEDURE — 80048 BASIC METABOLIC PNL TOTAL CA: CPT | Performed by: NURSE PRACTITIONER

## 2017-09-12 PROCEDURE — 97110 THERAPEUTIC EXERCISES: CPT

## 2017-09-12 RX ORDER — PSEUDOEPHEDRINE HCL 30 MG
100 TABLET ORAL 2 TIMES DAILY
Qty: 60 CAPSULE | Refills: 0 | Status: SHIPPED | OUTPATIENT
Start: 2017-09-12

## 2017-09-12 RX ORDER — OXYCODONE HYDROCHLORIDE AND ACETAMINOPHEN 5; 325 MG/1; MG/1
1 TABLET ORAL EVERY 4 HOURS PRN
Qty: 40 TABLET | Refills: 0 | Status: SHIPPED | OUTPATIENT
Start: 2017-09-12 | End: 2017-09-21

## 2017-09-12 RX ADMIN — ASPIRIN 325 MG: 325 TABLET, DELAYED RELEASE ORAL at 07:46

## 2017-09-12 RX ADMIN — CEFAZOLIN SODIUM 2 G: 2 INJECTION, SOLUTION INTRAVENOUS at 02:10

## 2017-09-12 RX ADMIN — HYDROMORPHONE HYDROCHLORIDE 0.5 MG: 1 INJECTION, SOLUTION INTRAMUSCULAR; INTRAVENOUS; SUBCUTANEOUS at 00:45

## 2017-09-12 RX ADMIN — PANTOPRAZOLE SODIUM 40 MG: 40 TABLET, DELAYED RELEASE ORAL at 07:56

## 2017-09-12 RX ADMIN — HYDROMORPHONE HYDROCHLORIDE 0.5 MG: 1 INJECTION, SOLUTION INTRAMUSCULAR; INTRAVENOUS; SUBCUTANEOUS at 10:55

## 2017-09-12 RX ADMIN — OXYCODONE AND ACETAMINOPHEN 2 TABLET: 5; 325 TABLET ORAL at 06:39

## 2017-09-12 RX ADMIN — INFLUENZA VIRUS VACCINE 0.5 ML: 15; 15; 15; 15 SUSPENSION INTRAMUSCULAR at 13:12

## 2017-09-12 RX ADMIN — DOCUSATE SODIUM 100 MG: 100 CAPSULE, LIQUID FILLED ORAL at 07:46

## 2017-09-12 RX ADMIN — KETOROLAC TROMETHAMINE 15 MG: 15 INJECTION, SOLUTION INTRAMUSCULAR; INTRAVENOUS at 00:45

## 2017-09-12 RX ADMIN — HYDROMORPHONE HYDROCHLORIDE 0.5 MG: 1 INJECTION, SOLUTION INTRAMUSCULAR; INTRAVENOUS; SUBCUTANEOUS at 07:47

## 2017-09-12 NOTE — THERAPY DISCHARGE NOTE
Acute Care - Physical Therapy Treatment Note/Discharge  [facility]     Patient Name: Ezekiel Reyes  : 1965  MRN: [id number]  Today's Date: 2017  Onset of Illness/Injury or Date of Surgery Date: 17  Date of Referral to PT: 17  Referring Physician: Dr. Sawyer    Admit Date: 2017    Visit Dx:    ICD-10-CM ICD-9-CM   1. Impaired functional mobility, balance, gait, and endurance Z74.09 V49.89   2. Hip pain, right M25.551 719.45   3. Status post revision total replacement of right hip Z96.641 V43.64   4. Impaired mobility and ADLs Z74.09 799.89     Patient Active Problem List   Diagnosis   • Pain due to total hip replacement   • Status post revision total replacement of right hip   • GERD (gastroesophageal reflux disease)   • Prediabetes   • Tobacco abuse       Physical Therapy Education     Title: PT OT SLP Therapies (Done)     Topic: Physical Therapy (Done)     Point: Mobility training (Done)    Learning Progress Summary    Learner Readiness Method Response Comment Documented by Status   Patient Acceptance E,D,H VU Reviewed hip precautions, gait mechanics, HEP, stairs, car transfer. Issued HEP handout MJ 17 1102 Done    Acceptance E,D KENY,NR Educated on hip precautions and safety with mobility.  17 1703 Done   Significant Other Acceptance E,D,H VU Reviewed hip precautions, gait mechanics, HEP, stairs, car transfer. Issued HEP handout MJ 17 1102 Done               Point: Home exercise program (Done)    Learning Progress Summary    Learner Readiness Method Response Comment Documented by Status   Patient Acceptance E,D,H VU Reviewed hip precautions, gait mechanics, HEP, stairs, car transfer. Issued HEP handout MJ 17 1102 Done    Acceptance E,D KENY,NR Educated on hip precautions and safety with mobility. LR 17 1703 Done   Significant Other Acceptance E,D,H VU Reviewed hip precautions, gait mechanics, HEP, stairs, car transfer. Issued HEP handout MJ 17 1102  Done               Point: Body mechanics (Done)    Learning Progress Summary    Learner Readiness Method Response Comment Documented by Status   Patient Acceptance E,D,H VU Reviewed hip precautions, gait mechanics, HEP, stairs, car transfer. Issued HEP handout  09/12/17 1102 Done    Acceptance E,D VU,NR Educated on hip precautions and safety with mobility.  09/11/17 1703 Done   Significant Other Acceptance E,D,H VU Reviewed hip precautions, gait mechanics, HEP, stairs, car transfer. Issued HEP handout  09/12/17 1102 Done               Point: Precautions (Done)    Learning Progress Summary    Learner Readiness Method Response Comment Documented by Status   Patient Acceptance E,D,H VU Reviewed hip precautions, gait mechanics, HEP, stairs, car transfer. Issued HEP handout  09/12/17 1102 Done    Acceptance E,D VU,NR Educated on hip precautions and safety with mobility.  09/11/17 1703 Done   Significant Other Acceptance E,D,H VU Reviewed hip precautions, gait mechanics, HEP, stairs, car transfer. Issued HEP handout  09/12/17 1102 Done                      User Key     Initials Effective Dates Name Provider Type Discipline     06/19/15 -  Becca Enrique, PT Physical Therapist PT     03/14/16 -  Billy Herrera, PT Physical Therapist PT                    IP PT Goals       09/12/17 1103 09/11/17 1620       Bed Mobility PT LTG    Bed Mobility PT LTG, Date Established  09/11/17  -LR     Bed Mobility PT LTG, Time to Achieve  5 days  -LR     Bed Mobility PT LTG, Activity Type  supine to sit/sit to supine  -LR     Bed Mobility PT LTG, [place] Level  conditional independence  -LR     Bed Mobility PT LTG, Date Goal Reviewed 09/12/17  -MJ 09/11/17  -LR     Bed Mobility PT LTG, Outcome goal met  - goal ongoing  -LR     Transfer Training PT LTG    Transfer Training PT LTG, Date Established  09/11/17  -LR     Transfer Training PT LTG, Time to Achieve  5 days  -LR     Transfer Training PT LTG, Activity Type   sit to stand/stand to sit  -LR     Transfer Training PT LTG, [place] Level  conditional independence  -LR     Transfer Training PT LTG, Assist Device  walker, rolling  -LR     Transfer Training PT  LTG, Date Goal Reviewed 09/12/17  - 09/11/17  -LR     Transfer Training PT LTG, Outcome goal not met  - goal ongoing  -LR     Transfer Training PT LTG, Reason Goal Not Met discharged from facility  -      Gait Training PT LTG    Gait Training Goal PT LTG, Date Established  09/11/17  -LR     Gait Training Goal PT LTG, Time to Achieve  5 days  -LR     Gait Training Goal PT LTG, [place] Level  conditional independence  -LR     Gait Training Goal PT LTG, Assist Device  walker, rolling  -LR     Gait Training Goal PT LTG, Distance to Achieve  750 feet  -LR     Gait Training Goal PT LTG, Date Goal Reviewed 09/12/17  -MJ 09/11/17  -LR     Gait Training Goal PT LTG, Outcome goal not met  - goal ongoing  -LR     Gait Training Goal PT LTG, Reason Goal Not Met discharged from [facility]  -      Stair Training PT STG    Stair Training Goal PT STG, Date Established  09/11/17  -LR     Stair Training Goal PT STG, Time to Achieve  5 days  -LR     Stair Training Goal PT STG, Number of Steps  1  -LR     Stair Training Goal PT STG, [place] Level  conditional independence  -LR     Stair Training Goal PT STG, Assist Device  walker, rolling   backwards  -LR     Stair Training Goal PT STG, Date Goal Reviewed 09/12/17  -MJ 09/11/17  -LR     Stair Training Goal PT STG, Outcome goal not met  - goal ongoing  -LR     Stair Training Goal PT STG, Reason Goal Not Met discharged from [facility]  -      Stair Training PT LTG    Stair Training Goal PT LTG, Date Established  09/11/17  -LR     Stair Training Goal PT LTG, Time to Achieve  5 days  -LR     Stair Training Goal PT LTG, Number of Steps  3  -LR     Stair Training Goal PT LTG, [place] Level  contact guard assist  -LR     Stair Training Goal PT LTG, Assist Device  1  handrail;cane, straight  -LR     Stair Training Goal PT LTG, Date Goal Reviewed 09/12/17  -MJ 09/11/17  -LR     Stair Training Goal PT LTG, Outcome goal not met  -MJ goal ongoing  -LR     Stair Training Goal PT LTG, Reason Goal Not Met discharged from facility  -MJ        User Key  (r) = Recorded By, (t) = Taken By, (c) = Cosigned By    Initials Name Provider Type     Becca Enrique, PT Physical Therapist     Billy Herrera, PT Physical Therapist              Adult Rehabilitation Note       09/12/17 1005          Rehab Assessment/Intervention    Discipline physical therapist  -      Document Type therapy note (daily note);discharge summary  -      Subjective Information agree to therapy;complains of;pain  -MJ      Patient Effort, Rehab Treatment good  -MJ      Symptoms Noted During/After Treatment increased pain  -MJ      Symptoms Noted Comment RN notified  -MJ      Precautions/Limitations fall precautions;hip precautions- right  -MJ      Recorded by [MJ] Billy Herrera, PT      Pain Assessment    Pain Assessment 0-10  -MJ      Pain Score 2  -MJ      Post Pain Score 8  -MJ      Pain Type Acute pain  -MJ      Pain Location Hip  -MJ      Pain Orientation Right  -MJ      Pain Intervention(s) Repositioned;Ambulation/increased activity  -MJ      Recorded by [MJ] Billy Herrera, PT      Cognitive Assessment/Intervention    Current Cognitive/Communication Assessment functional  -MJ      Orientation Status oriented x 4  -MJ      Follows Commands/Answers Questions 100% of the time;able to follow single-step instructions;needs cueing;needs repetition  -MJ      Personal Safety mild impairment  -MJ      Recorded by [MJ] Billy Herrera, PT      Mobility Assessment/Training    Extremity Weight-Bearing Status right lower extremity  -MJ      Right Lower Extremity Weight-Bearing weight-bearing as tolerated  -MJ      Recorded by [MJ] Billy Herrera, PT      Bed Mobility, Assessment/Treatment    Bed Mobility, Assistive Device bed  rails;head of bed elevated  -MJ      Bed Mob, Supine to Sit, [place] conditional independence;verbal cues required  -MJ      Bed Mob, Sit to Supine, [place] conditional independence;verbal cues required  -MJ      Bed Mobility, Safety Issues decreased use of legs for bridging/pushing  -      Bed Mobility, Impairments ROM decreased;strength decreased;pain  -MJ      Bed Mobility, Comment Verbal cues for sequencing, increased time and effort to perform  -      Recorded by [MJ] Billy Herrera, PT      Transfer Assessment/Treatment    Transfers, Sit-Stand [place] supervision required;verbal cues required  -MJ      Transfers, Stand-Sit [place] supervision required;verbal cues required  -      Transfers, Sit-Stand-Sit, Assist Device rolling walker  -      Transfer, Safety Issues step length decreased;weight-shifting ability decreased  -      Transfer, Impairments ROM decreased;strength decreased;pain  -      Transfer, Comment Verbal cues for correct hand placement and to step R LE out prior to t/f.   -      Recorded by [MJ] Billy Herrera, PT      Gait Assessment/Treatment    Gait, [place] Level stand by assist;verbal cues required  -      Gait, Assistive Device rolling walker  -      Gait, Distance (Feet) 450  -      Gait, Gait Pattern Analysis swing-through gait  -      Gait, Gait Deviations right:;antalgic;ricardo decreased;step length decreased;weight-shifting ability decreased  -      Gait, Safety Issues step length decreased;weight-shifting ability decreased  -      Gait, Impairments ROM decreased;strength decreased;pain  -      Gait, Comment Pt demo step through gait pattern. Verbal cues to bend knee during swing to elicit heel strike and to increase LE weight bearing, improvement noted. Cues to  feet during turn. Gait limited by pain and fatigue  -      Recorded by [MJ] Billy Herrera, PT      Stairs Assessment/Treatment    Number of Stairs 4  -      Stairs,  Handrail Location both sides  -      Stairs, [place] Level contact guard assist;verbal cues required  -      Stairs, Assistive Device walker  -      Stairs, Technique Used step to step (ascending);step to step (descending)  -      Stairs, Safety Issues sequencing ability decreased;weight-shifting ability decreased  -      Stairs, Impairments ROM decreased;strength decreased;pain  -      Stairs, Comment Pt performed non-reciprocally, 3 steps in sequence with HRs and 1 step backward with RW. Verbal cues to ascend with L LE and to descend leading with R LE  -      Recorded by [MJ] Billy Herrera, PT      Therapy Exercises    Exercise Protocols total hip   revision  -      Total Hip Exercises left:;15 reps;completed protocol;with assist;ankle pumps/circles;quad set;glut set;heel slides;hip abduction;SAQ;LAQ;hip flexion;SLR   Cues for technique. Reyes w/ SLR  -      Recorded by [JJ] Billy Herrera PT      Positioning and Restraints    Pre-Treatment Position in bed  -      Post Treatment Position bed  -      In Bed notified nsg;supine;call light within reach;encouraged to call for assist;with family/caregiver  -      Recorded by [MJ] Billy Herrera, PT        User Key  (r) = Recorded By, (t) = Taken By, (c) = Cosigned By    Initials Name Effective Dates    JJ Herrera PT 03/14/16 -           PT Recommendation and Plan  Anticipated Equipment Needs At Discharge: front wheeled walker, bedside commode, tub bench, standard cane  Anticipated Discharge Disposition: home with assist, home with home health  Planned Therapy Interventions: balance training, bed mobility training, gait training, home exercise program, patient/family education, ROM (Range of Motion), stair training, strengthening, transfer training  PT Frequency: 2 times/day  Plan of Care Review  Plan Of Care Reviewed With: patient  Progress: improving  Outcome Summary/Follow up Plan: Pt ambulated 450 feet with SBA and RW and progressed to  stair training this date. Pt anticipates discharge home today with HHPT, made good progress toward goals during inpatient stay          Outcome Measures       09/12/17 1005 09/11/17 1620       How much help from another person do you currently need...    Turning from your back to your side while in flat bed without using bedrails? 4  -MJ 3  -LR     Moving from lying on back to sitting on the side of a flat bed without bedrails? 4  -MJ 3  -LR     Moving to and from a bed to a chair (including a wheelchair)? 3  -MJ 3  -LR     Standing up from a chair using your arms (e.g., wheelchair, bedside chair)? 3  -MJ 3  -LR     Climbing 3-5 steps with a railing? 3  -MJ 3  -LR     To walk in hospital room? 3  -MJ 3  -LR     AM-PAC 6 Clicks Score 20  -MJ 18  -LR     Functional Assessment    Outcome Measure Options AM-PAC 6 Clicks Basic Mobility (PT)  -MJ AM-PAC 6 Clicks Basic Mobility (PT)  -LR       User Key  (r) = Recorded By, (t) = Taken By, (c) = Cosigned By    Initials Name Provider Type    LR Becca Enrique, PT Physical Therapist    JJ Herrera PT Physical Therapist           Time Calculation:         PT Charges       09/12/17 1106          Time Calculation    Start Time 1005  -MJ      PT Received On 09/12/17  -      PT Goal Re-Cert Due Date 09/21/17  -      Time Calculation- PT    Total Timed Code Minutes- PT 38 minute(s)  -        User Key  (r) = Recorded By, (t) = Taken By, (c) = Cosigned By    Initials Name Provider Type    JJ Herrera PT Physical Therapist          Therapy Charges for Today     Code Description Service Date Service Provider Modifiers Qty    23477850512 HC GAIT TRAINING EA 15 MIN 9/12/2017 Billy Herrera PT GP 1    95375650938 HC PT THER PROC EA 15 MIN 9/12/2017 Billy Herrera PT GP 2          PT G-Codes  Outcome Measure Options: AM-PAC 6 Clicks Basic Mobility (PT)    PT Discharge Summary  Reason for Discharge: Discharge from facility  Outcomes Achieved: Patient able to partially  christy established goals  Discharge Destination: Home with assist, Home with home health    Billy Herrera, PT  9/12/2017

## 2017-09-12 NOTE — PROGRESS NOTES
"Ezekiel Reyes  [id number]  1965    /78  Pulse 83  Temp 98.3 °F (36.8 °C) (Oral)   Resp 16  Ht 64\" (162.6 cm)  Wt 190 lb (86.2 kg)  SpO2 96%  BMI 32.61 kg/m2    Lab Results (last 24 hours)     Procedure Component Value Units Date/Time    OR Potassium [[id number]]  (Normal) Collected:  09/11/17 0826    Specimen:  Blood from Arm, Left Updated:  09/11/17 0830     Potassium, OR 3.94 mmol/L     Anaerobic Culture [[id number]] Collected:  09/11/17 1128    Specimen:  Synovium from Hip, Right Updated:  09/11/17 1223    Fungus Culture [[id number]] Collected:  09/11/17 1128    Specimen:  Synovium from Hip, Right Updated:  09/11/17 1223    AFB Culture [[id number]] Collected:  09/11/17 1128    Specimen:  Synovium from Hip, Right Updated:  09/11/17 1223    POC Glucose Fingerstick [[id number]]  (Normal) Collected:  09/11/17 1227    Specimen:  Blood Updated:  09/11/17 1228     Glucose 126 mg/dL     Narrative:       Meter: [id number] : [id number] Silverio BILLINGS    Tissue/Bone Culture [[id number]] Collected:  09/11/17 1128    Specimen:  Synovium from Hip, Right Updated:  09/11/17 1409     Gram Stain Result No WBCs or organisms seen    POC Glucose Fingerstick [[id number]]  (Normal) Collected:  09/11/17 1558    Specimen:  Blood Updated:  09/11/17 1559     Glucose 105 mg/dL     Narrative:       Meter: [id number] : [id number] Julia Boles    POC Glucose Fingerstick [[id number]]  (Abnormal) Collected:  09/11/17 2213    Specimen:  Blood Updated:  09/11/17 2225     Glucose 161 (H) mg/dL     Narrative:       Verify with Lab Meter: [id number] : [id number] Brandon Doanstrony    POC Glucose Fingerstick [[id number]]  (Normal) Collected:  09/12/17 0747    Specimen:  Blood Updated:  09/12/17 0809     Glucose 127 mg/dL     Narrative:       Meter: [id number] : [id number] [place] f [place]          Patient Care Team:  Julio Sawyer MD as PCP - General (Orthopedic Surgery)    SUBJECTIVE  Pain worse this morning.  Excellent start in " physical therapy.    PHYSICAL EXAM  Incision benign  Minimal thigh swelling  Neurovascularly intact to knees and toes     Principal Problem:    Status post revision total replacement of right hip  Active Problems:    Pain due to total hip replacement    GERD (gastroesophageal reflux disease)    Prediabetes    Tobacco abuse      PLAN / DISPOSITION:  Hemoglobin pending - no transfusion anticipated  Discharge home today if adequate pain control with ambulation    Julio Sawyer MD  09/12/17  8:13 AM

## 2017-09-12 NOTE — THERAPY DISCHARGE NOTE
Acute Care - Occupational Therapy Initial Eval/Discharge   [place]     Patient Name: Ezekiel Reyes  : 1965  MRN: [id number]  Today's Date: 2017  Onset of Illness/Injury or Date of Surgery Date: 17  Date of Referral to OT: 17  Referring Physician: Dr. Sawyer      Admit Date: 2017       ICD-10-CM ICD-9-CM   1. Impaired functional mobility, balance, gait, and endurance Z74.09 V49.89   2. Hip pain, right M25.551 719.45   3. Status post revision total replacement of right hip Z96.641 V43.64   4. Impaired mobility and ADLs Z74.09 799.89     Patient Active Problem List   Diagnosis   • Pain due to total hip replacement   • Status post revision total replacement of right hip   • GERD (gastroesophageal reflux disease)   • Prediabetes   • Tobacco abuse     Past Medical History:   Diagnosis Date   • Acid reflux    • Arthritis    • Wears dentures    • Wears glasses      Past Surgical History:   Procedure Laterality Date   • CARDIAC CATHETERIZATION     • CHOLECYSTECTOMY     • COLONOSCOPY     • JOINT REPLACEMENT Right     Trochanteric nailing then removal with Right FRANKO   • JOINT REPLACEMENT Left    • SHOULDER SURGERY     • TOTAL HIP ARTHROPLASTY REVISION Right 2017    Procedure: TOTAL HIP ARTHROPLASTY ANTERIOR REVISON RIGHT ;  Surgeon: Julio Sawyer MD;  Location: [facility];  Service:           OT ASSESSMENT FLOWSHEET (last 72 hours)      OT Evaluation       17 1111 17 1005 17 0955 17 0923 17 1620    Rehab Evaluation    Document Type  therapy note (daily note);discharge summary  -MJ evaluation;therapy note (daily note);discharge summary  -AR  evaluation  -LR    Subjective Information  agree to therapy;complains of;pain  -MJ agree to therapy;complains of;pain;fatigue  -AR  agree to therapy;complains of;pain  -LR    Patient Effort, Rehab Treatment  good  -MJ excellent  -AR  excellent  -LR    Symptoms Noted During/After Treatment  increased pain  -MJ increased pain   -AR  none  -LR    Symptoms Noted Comment  RN notified  -MJ nurse notified  -AR      General Information    Patient Profile Review   yes  -AR  yes  -LR    Onset of Illness/Injury or Date of Surgery Date   09/11/17  -AR  09/11/17  -LR    Referring Physician   Dr. Sawyer  -AR  MD Silverio  -LR    General Observations   Pt supine, spouse at bedside. They anticipate DC home today  -AR  Patient supine in bed upon arrival. IV, SCDs. Wife present.   -LR    Pertinent History Of Current Problem   Pt is a 52 yom who presents for surgical management of progressive R hip pain and dysfunction s/p RTHA and intertrochanteric nail removal 2007. Pt is POD#1 revision right THR. Prior to admission, pt had pain in R leg, mobility was limited and he required assist with LB ALDS.   -AR  Patient presents for surgical management of persistent and progressive R hip pain and dysfunction, s/p R FRANKO and intertrochanteric nail removal in 2007.   -LR    Precautions/Limitations  fall precautions;hip precautions- right  - hip precautions- right  -AR  fall precautions;hip precautions- right;other (see comments)   impulsive  -LR    Prior Level of Function   min assist:;community mobility;all household mobility;gait;transfer;ADL's;bed mobility  -AR  min assist:;all household mobility;community mobility;gait;transfer;bed mobility;home management;cooking;cleaning;shopping;using stairs;independent:;driving;yard work   all mobility limited by pain  -LR    Equipment Currently Used at Home   none   has access to tub bench, reacher, shoe horn and sock aide  -AR  none  -LR    Plans/Goals Discussed With   patient and family;agreed upon  -AR  patient;spouse/S.O.;agreed upon  -LR    Risks Reviewed   patient and family:;LOB;nausea/vomiting;dizziness;increased discomfort;change in vital signs;lines disloged;increased drainage  -AR  patient:;spouse/S.O.:;LOB;nausea/vomiting;dizziness;increased discomfort;lines disloged  -LR    Benefits Reviewed   patient and  family:;increase independence;improve function;increase strength;increase balance;decrease pain;decrease risk of DVT;increase knowledge  -AR  patient:;spouse/S.O.:;improve function;increase independence;increase strength;increase balance;decrease pain;increase knowledge  -LR    Barriers to Rehab   none identified;previous functional deficit  -AR  previous functional deficit  -LR    Living Environment    Lives With   spouse;child(malena), dependent  -AR  spouse;child(malena), dependent  -LR    Living Arrangements   house  -AR  house  -LR    Home Accessibility   bed and bath on same level;tub/shower is not walk in;stairs to enter home  -AR  bed and bath on same level;house is not wheelchair accessible;stairs to enter home;tub/shower is not walk in  -LR    Number of Stairs to Enter Home   4  -AR  4   3 steps onto porch, 1 step into home  -LR    Number of Stairs Within Home   --   pt does not have to access basement  -AR  --   to basement, does not have to access   -LR    Stair Railings at Home   outside, present on right side  -AR  outside, present on right side  -LR    Type of Financial/Environmental Concern   none  -AR  none  -LR    Transportation Available   car;family or friend will provide  -AR car  - family or friend will provide  -LR    Living Environment Comment   Pt lives with spouse who is available for one week, then return to work.   -AR  Patient's wife available to assist for the first week, then has to return to work.   -LR    Clinical Impression    Date of Referral to OT   09/11/17  -AR      OT Diagnosis   decreased independence with ADLS  -AR      Patient/Family Goals Statement   decrease pain, return home  -AR      Criteria for Skilled Therapeutic Interventions Met   yes;treatment indicated  -AR      Rehab Potential   good, to achieve stated therapy goals  -AR      Therapy Frequency   evaluation only  -AR      Anticipated Equipment Needs At Discharge   bedside commode;front wheeled walker  -AR       Anticipated Discharge Disposition home with assist;home with home health  -AR  home with home health  -AR      Pain Assessment    Pain Assessment  0-10  -MJ 0-10  -AR  0-10  -LR    Pain Score  2  -MJ 2  -AR  10  -LR    Post Pain Score  8  -MJ 8  -AR  10  -LR    Pain Type  Acute pain  -MJ Acute pain  -AR  Acute pain  -LR    Pain Location  Hip  -MJ Hip  -AR  Hip  -LR    Pain Orientation  Right  -MJ Right  -AR  Right  -LR    Pain Intervention(s)  Repositioned;Ambulation/increased activity  -MJ Repositioned;Ambulation/increased activity;Medication (See MAR)  -AR  Repositioned;Ambulation/increased activity  -LR    Response to Interventions   tolerated  -AR      Vision Assessment/Intervention    Visual Impairment   WFL  -AR  WFL  -LR    Cognitive Assessment/Intervention    Current Cognitive/Communication Assessment  functional  -MJ functional  -AR  functional  -LR    Orientation Status  oriented x 4  -MJ oriented x 4  -AR  oriented x 4;required verbal cueing (specifiy in comments)  -LR    Follows Commands/Answers Questions  100% of the time;able to follow single-step instructions;needs cueing;needs repetition  -% of the time  -AR  75% of the time;able to follow single-step instructions;needs cueing;needs repetition   decreased safety awareness.   -LR    Personal Safety  mild impairment  -MJ WNL/WFL  -AR  mild impairment;at risk behaviors demonstrated;decreased awareness, need for assist;decreased awareness, need for safety;decreased insight to deficits;impulsive  -LR    ROM (Range of Motion)    General ROM   no range of motion deficits identified   BUE  -AR  lower extremity range of motion deficits identified  -LR    MMT (Manual Muscle Testing)    General MMT Assessment   no strength deficits identified   BUE WFL ADLS  -AR  lower extremity strength deficits identified  -LR    Mobility Assessment/Training    Extremity Weight-Bearing Status  right lower extremity  -MJ right lower extremity  -AR  right lower extremity   -LR    Right Lower Extremity Weight-Bearing  weight-bearing as tolerated  -MJ weight-bearing as tolerated  -AR  weight-bearing as tolerated  -LR    Bed Mobility, Assessment/Treatment    Bed Mobility, Assistive Device  bed rails;head of bed elevated  -MJ head of bed elevated  -AR  head of bed elevated;bed rails  -LR    Bed Mob, Supine to Sit, [place]  conditional independence;verbal cues required  -MJ conditional independence  -AR  supervision required  -LR    Bed Mob, Sit to Supine, [place]  conditional independence;verbal cues required  -MJ conditional independence  -AR  not tested   UI at end of eval.   -LR    Bed Mobility, Safety Issues  decreased use of legs for bridging/pushing  -MJ       Bed Mobility, Impairments  ROM decreased;strength decreased;pain  -MJ pain  -AR  ROM decreased;strength decreased;pain  -LR    Bed Mobility, Comment  Verbal cues for sequencing, increased time and effort to perform  -MJ pt declined need for leg llifter, he hooked RLE under LLE using LLE to move limb  -AR      Transfer Assessment/Treatment    Transfers, Sit-Stand [place]  supervision required;verbal cues required  -MJ supervision required  -AR  verbal cues required;stand by assist  -LR    Transfers, Stand-Sit [place]  supervision required;verbal cues required  -MJ supervision required  -AR  verbal cues required;stand by assist  -LR    Transfers, Sit-Stand-Sit, Assist Device  rolling walker  -MJ rolling walker  -AR  rolling walker  -LR    Bathtub Transfer, [place]   supervision required  -AR      Bathtub Transfer, Assistive Device   rolling walker;transfer tub bench  -AR      Transfer, Safety Issues  step length decreased;weight-shifting ability decreased  -MJ   sequencing ability decreased;step length decreased;weight-shifting ability decreased  -LR    Transfer, Impairments  ROM decreased;strength decreased;pain  -MJ pain;ROM decreased   RLE PHP  -AR  ROM decreased;strength decreased;pain  -LR     Transfer, Comment  Verbal cues for correct hand placement and to step R LE out prior to t/f.   - Educated pt on RLE PHP and safe car and tub bench transfer technique to maintain, pt required cues to advance RLE during stand-to-sit transition   -AR  Verbal cues for correct hand placement with t/f and to step L LE out before t/f for comfort.   -LR    Functional Mobility    Functional Mobility- Ind. Level   supervision required  -AR      Functional Mobility- Device   rolling walker  -AR      Stairs Assessment/Treatment    Number of Stairs  4  -MJ       Stairs, Handrail Location  both sides  -       Stairs, [place] Level  contact guard assist;verbal cues required  -       Stairs, Assistive Device  walker  -       Stairs, Technique Used  step to step (ascending);step to step (descending)  -       Stairs, Safety Issues  sequencing ability decreased;weight-shifting ability decreased  -       Stairs, Impairments  ROM decreased;strength decreased;pain  -       Stairs, Comment  Pt performed non-reciprocally, 3 steps in sequence with HRs and 1 step backward with RW. Verbal cues to ascend with L LE and to descend leading with R LE  -       Lower Body Bathing Assessment/Training    LB Bathing Assess/Train, Comment   Pt reports having access to LH sponge that his mother owned, educated him on importance of maintaining RLE THP during LB ADLS  -AR      Upper Body Dressing Assessment/Training    UB Dressing Assess/Train, Clothing Type   donning:;doffing:;hospital gown;t-shirt  -AR      UB Dressing Assess/Train, Position   edge of bed  -AR      UB Dressing Assess/Train, [place]   supervision required  -AR      Lower Body Dressing Assessment/Training    LB Dressing Assess/Train, Clothing Type   donning:;slipper socks  -AR      LB Dressing Assess/Train, Assist Device   sock-aid  -AR      LB Dressing Assess/Train, Position   edge of bed  -AR      LB Dressing Assess/Train, [place]   supervision required   -AR      LB Dressing Assess/Train, Impairments   other (see comments)   RLE PHP  -AR      LB Dressing Assess/Train, Comment   Pt has access to AE from a family member. Educated pt on use as well as incorporation of jean-dressing technique  -AR      Toileting Assessment/Training    Toileting Assess/Train, Comment   educated pt on placing BSC frame over his commode at home to assiist with maintain RLE THP and ease of transfers  -AR      Therapy Exercises    Exercise Protocols  total hip   revision  -MJ   total hip   revision  -LR    Total Hip Exercises  left:;15 reps;completed protocol;with assist;ankle pumps/circles;quad set;glut set;heel slides;hip abduction;SAQ;LAQ;hip flexion;SLR   Cues for technique. Reyes w/ SLR  -MJ   right:;10 reps;ankle pumps/circles  -LR    Sensory Assessment/Intervention    Sensory Impairment     --   denies numbness/tingling; actively DF bilaterally  -LR    General Therapy Interventions    Adaptive Equipment Training   Edcuated pt on RLE PHP and use of AE to assist with maintaining  -AR      ADL Retraining   Educated pt on RLE PHP, AE use, incorporation of jean-dressing technique   -AR      Bed Mobility Training   educated pt on safe bed mobility technique to maintain RLE THP  -AR      Transfer Training   Edcated pt on maintaining RLE THP during transfer training, reviewed car/bed and tub bench transfers   -AR      Positioning and Restraints    Pre-Treatment Position  in bed  -MJ in bed  -AR  in bed  -LR    Post Treatment Position  bed  -MJ bed  -AR  chair  -LR    In Bed  notified nsg;supine;call light within reach;encouraged to call for assist;with family/caregiver  -MJ supine;call light within reach;encouraged to call for assist;with family/caregiver;with PT  -AR      In Chair     notified nsg;reclined;sitting;call light within reach;encouraged to call for assist;with family/caregiver;legs elevated  -LR    General LE Assessment    ROM     LLE ROM was WFL;hip, right: LE ROM deficit  -LR     ROM Detail     R hip AROM impaired 25%  -LR    Lower Extremity    Lower Ext Manual Muscle Testing     left LE strength is WFL;right hip strength deficit  -LR    Lower Ext Manual Muscle Testing Detail     R hip functionally 4-/5  -LR      09/11/17 1345 09/11/17 0809             General Information    Equipment Currently Used at Home  none  -DB       Living Environment    Lives With  spouse;child(malena), dependent;child(malena), adult  -DB       Living Arrangements  house  -DB       Home Accessibility  no concerns  -DB       Number of Stairs to Enter Home  7  -DB       Number of Stairs Within Home  15  -DB       Stair Railings at Home  outside, present at both sides  -DB       Type of Financial/Environmental Concern  none  -DB       Transportation Available  family or friend will provide;public transportation  -DB       Living Environment Comment  na  -DB       Functional Level Prior    Ambulation 0-->independent  -JR 0-->independent  -DB       Transferring 0-->independent  -JR 0-->independent  -DB       Toileting 0-->independent  -JR 0-->independent  -DB       Bathing 0-->independent  -JR 0-->independent  -DB       Dressing 0-->independent  -JR 0-->independent  -DB       Eating 0-->independent  -JR 0-->independent  -DB       Communication 0-->understands/communicates without difficulty  -JR 0-->understands/communicates without difficulty  -DB       Swallowing 0-->swallows foods/liquids without difficulty  -JR 0-->swallows foods/liquids without difficulty  -DB       Prior Functional Level Comment na  -JR na  -DB         User Key  (r) = Recorded By, (t) = Taken By, (c) = Cosigned By    Initials Name Effective Dates    LR Becca Enrique, PT 06/19/15 -     AR Darcie Dumont, OT 06/22/15 -      Lexie Gomes 05/02/16 -     JJ Herrera, PT 03/14/16 -     DB Marshall Spann, RN 06/16/16 -     JR Kings Knight, RN 02/06/17 -           Occupational Therapy Education     Title: PT OT SLP Therapies (Resolved)      Topic: Occupational Therapy (Resolved)     Point: ADL training (Resolved)    Description: Instruct learner(s) on proper safety adaptation and remediation techniques during self care or transfers.   Instruct in proper use of assistive devices.    Learning Progress Summary    Learner Readiness Method Response Comment Documented by Status   Patient Eager E,TB,AYESHA,H XIOMY BUSTILLO reviewed RLE THP, incorporation into bed mobility/transfer training/ADL routine, AE use, jean-dressing, trtansfer training AR 09/12/17 1105 Done   Significant Other Eager E,JOSE,AYESHA,H XIOMY BUSTILLO reviewed RLE THP, incorporation into bed mobility/transfer training/ADL routine, AE use, jean-dressing, trtansfer training AR 09/12/17 1105 Done               Point: Home exercise program (Resolved)    Description: Instruct learner(s) on appropriate technique for monitoring, assisting and/or progressing therapeutic exercises/activities.    Learning Progress Summary    Learner Readiness Method Response Comment Documented by Status   Patient Eager E,TB,AYESHA,H XIOMY BUSTILLO reviewed RLE THP, incorporation into bed mobility/transfer training/ADL routine, AE use, jean-dressing, trtansfer training AR 09/12/17 1105 Done   Significant Other Eager E,AYESHA GARCIA,H XIOMY BUSTILLO reviewed RLE THP, incorporation into bed mobility/transfer training/ADL routine, AE use, jean-dressing, trtansfer training AR 09/12/17 1105 Done               Point: Precautions (Resolved)    Description: Instruct learner(s) on prescribed precautions during self-care and functional transfers.    Learning Progress Summary    Learner Readiness Method Response Comment Documented by Status   Patient Eager E,TB,AYESHA,H XIOMY BUSTILLO reviewed RLE THP, incorporation into bed mobility/transfer training/ADL routine, AE use, jean-dressing, trtansfer training AR 09/12/17 1105 Done   Significant Other Eager E,TB,AYESHA,H KENY,XIOMY reviewed RLE THP, incorporation into bed mobility/transfer training/ADL routine, AE use, jean-dressing, trtansfer training AR 09/12/17  1105 Done               Point: Body mechanics (Resolved)    Description: Instruct learner(s) on proper positioning and spine alignment during self-care, functional mobility activities and/or exercises.    Learning Progress Summary    Learner Readiness Method Response Comment Documented by Status   Patient JOSE Gudino D,XIOMY SAXENA reviewed RLE THP, incorporation into bed mobility/transfer training/ADL routine, AE use, jean-dressing, trtansfer training AR 09/12/17 1105 Done   Significant Other JOSE Gudino D, H VU, DU reviewed RLE THP, incorporation into bed mobility/transfer training/ADL routine, AE use, jean-dressing, trtansfer training AR 09/12/17 1105 Done                      User Key     Initials Effective Dates Name Provider Type Discipline    AR 06/22/15 -  Darcie Dumont OT Occupational Therapist OT                OT Recommendation and Plan  Anticipated Equipment Needs At Discharge: bedside commode, front wheeled walker  Anticipated Discharge Disposition: home with assist, home with home health  Therapy Frequency: evaluation only  Plan of Care Review  Plan Of Care Reviewed With: patient, spouse  Progress: improving  Outcome Summary/Follow up Plan: Educated pt/spouse on RLE THP, incorporation into bed mobility/transfer training and ADL retraining. Reviewed AE use and pt plant to borrow AE items from family member. Recommend DC home with HHOT, assist from family, BSC, RW and tub bench which pt plans to borrow from family member.            OT Goals       09/12/17 1106          Transfer Training OT LTG    Transfer Training OT LTG, Date Established 09/12/17  -AR      Transfer Training OT LTG, Time to Achieve by discharge  -AR      Transfer Training OT LTG, Activity Type sit to stand/stand to sit;tub  -AR      Transfer Training OT LTG, [place] Level verbal cues required;contact guard assist  -AR      Transfer Training OT LTG, Assist Device walker, rolling;tub bench  -AR      Transfer Training OT LTG, Outcome  goal met  -AR      Patient Education OT LTG    Patient Education OT LTG, Date Established 09/12/17  -AR      Patient Education OT LTG, Time to Achieve by discharge  -AR      Patient Education OT LTG, Education Type precautions per surgeon;1 hand/jean technique;home safety;adaptive equipment mgmt  -AR      Patient Education OT LTG, Education Understanding demonstrates adequately;verbalizes understanding  -AR      Patient Education OT LTG Outcome goal met  -AR      LB Dressing OT LTG    LB Dressing Goal OT LTG, Date Established 09/12/17  -AR      LB Dressing Goal OT LTG, Time to Achieve by discharge  -AR      LB Dressing Goal OT LTG, Activity Type Pt will complee LB dressing task while maintaining RLE THP  -AR      LB Dressing Goal OT LTG, [place] Level verbal cues required;supervision required  -AR      LB Dressing Goal OT LTG, Adaptive Equipment sock-aid  -AR      LB Dressing Goal OT LTG, Outcome goal met  -AR        User Key  (r) = Recorded By, (t) = Taken By, (c) = Cosigned By    Initials Name Provider Type    ELEN Dumont, OT Occupational Therapist                Outcome Measures       09/12/17 1005 09/12/17 0955 09/11/17 1620    How much help from another person do you currently need...    Turning from your back to your side while in flat bed without using bedrails? 4  -MJ  3  -LR    Moving from lying on back to sitting on the side of a flat bed without bedrails? 4  -MJ  3  -LR    Moving to and from a bed to a chair (including a wheelchair)? 3  -MJ  3  -LR    Standing up from a chair using your arms (e.g., wheelchair, bedside chair)? 3  -MJ  3  -LR    Climbing 3-5 steps with a railing? 3  -MJ  3  -LR    To walk in hospital room? 3  -MJ  3  -LR    AM-PAC 6 Clicks Score 20  -MJ  18  -LR    How much help from another is currently needed...    Putting on and taking off regular lower body clothing?  2  -AR     Bathing (including washing, rinsing, and drying)  3  -AR     Toileting (which includes using  toilet bed pan or urinal)  4  -AR     Putting on and taking off regular upper body clothing  4  -AR     Taking care of personal grooming (such as brushing teeth)  4  -AR     Eating meals  4  -AR     Score  21  -AR     Functional Assessment    Outcome Measure Options AM-PAC 6 Clicks Basic Mobility (PT)  -MJ AM-PAC 6 Clicks Daily Activity (OT)  -AR AM-PAC 6 Clicks Basic Mobility (PT)  -LR      User Key  (r) = Recorded By, (t) = Taken By, (c) = Cosigned By    Initials Name Provider Type    LR Becca Enrique, PT Physical Therapist    AR Darcie Dumont, OT Occupational Therapist    JJ Herrera, PT Physical Therapist          Time Calculation:         Time Calculation- OT       09/12/17 1111          Time Calculation- OT    OT Start Time 0955  -AR      Total Timed Code Minutes- OT 12 minute(s)  -AR      OT Received On 09/12/17  -AR      OT Goal Re-Cert Due Date 09/22/17  -AR        User Key  (r) = Recorded By, (t) = Taken By, (c) = Cosigned By    Initials Name Provider Type    AR Darcie Dumont OT Occupational Therapist          Therapy Charges for Today     Code Description Service Date Service Provider Modifiers Qty    [id number]  OT EVAL LOW COMPLEXITY 4 9/12/2017 Darcie Dumont OT GO 1    [id number]  OT THERAPEUTIC ACT EA 15 MIN 9/12/2017 Darcie Dumont OT GO 1               OT Discharge Summary  Anticipated Discharge Disposition: home with assist, home with home health  Reason for Discharge: Discharge from facility  Outcomes Achieved: Able to achieve all goals within established timeline  Discharge Destination: Home with assist, Home with home health    Darcie Dumont OT  9/12/2017

## 2017-09-12 NOTE — DISCHARGE PLACEMENT REQUEST
"Shey Reyes (52 y.o. Male)     Date of Birth Social Security Number Address Home Phone MRN    1965  [address]   [place] [address] [phone] [id number]    [employer] Marital Status          Other Unknown       Admission Date Admission Type Admitting Provider Attending Provider Department, Room/Bed    9/11/17 Elective Julio Sawyer MD [place], [place], MD [facility] 5E, S532/1    Discharge Date Discharge Disposition Discharge Destination         Home or Self Care             Attending Provider: Cipriano Davalos MD     Allergies:  No Known Allergies    Isolation:  None   Infection:  None   Code Status:  FULL    Ht:  64\" (162.6 cm)   Wt:  190 lb (86.2 kg)    Admission Cmt:  None   Principal Problem:  Status post revision total replacement of right hip [Z96.641]                 Active Insurance as of 9/11/2017     Primary Coverage     Payor Plan Insurance Group Employer/Plan Group    MEDICARE MEDICARE A & B      Payor Plan Address Payor Plan Phone Number Effective From Effective To    [address] [phone] 3/1/2001     [place] [address]       Subscriber Name Subscriber Birth Date Member ID       SHEY REYES 1965 [id number]           Secondary Coverage     Payor Plan Insurance Group Employer/Plan Group    HUMANA MEDICAID HU[employer]NA [employer] [facility]     Payor Plan Address Payor Plan Phone Number Effective From Effective To    [address]  [phone] 8/29/2017     [place] [address]       Subscriber Name Subscriber Birth Date Member ID       SHEY REYES 1965 [id number]                 Emergency Contacts      (Rel.) Home Phone Work Phone Mobile Phone    Zahra Reyes (Spouse) [phone] -- [phone]               History & Physical      RUBI Hastings at 9/11/2017  8:16 AM          Pre-Op H&P  Shey Reyes  [id number]  1965    Chief complaint: Right hip pain    HPI:    Patient is a 52 y.o.male presents with pain due to total right hip " replacement and here today for right hip revision arthroplasty anterior.  S/P Trochanteric nail of right hip and then underwent removal of trochanteric nail of right hip and right total hip replacement 6/2007.    Review of Systems:  General ROS: negative for chills, fever or skin lesions;  No changes since last office visit  Cardiovascular ROS: no chest pain or dyspnea on exertion  Respiratory ROS: no cough, shortness of breath, or wheezing    Allergies: No Known Allergies    Home Meds:    Prescriptions Prior to Admission   Medication Sig Dispense Refill Last Dose   • HYDROcodone-acetaminophen (NORCO) 7.5-325 MG per tablet Take 1 tablet by mouth 2 (Two) Times a Day As Needed for Moderate Pain .          PMH:   Past Medical History:   Diagnosis Date   • Acid reflux    • Arthritis    • Wears dentures    • Wears glasses      PSH:    Past Surgical History:   Procedure Laterality Date   • CARDIAC CATHETERIZATION     • CHOLECYSTECTOMY     • COLONOSCOPY     • JOINT REPLACEMENT      Right hip replaced    • SHOULDER SURGERY     Left hip replacement    Immunization History:  Influenza: no  Pneumococcal: yes 2016  Tetanus: unknown    Social History:   Tobacco:   History   Smoking Status   • Current Every Day Smoker   • Packs/day: 0.25   • Years: 3.00   • Types: Cigarettes   Smokeless Tobacco   • Never Used      Alcohol:     History   Alcohol Use No       Vitals:           /98 (BP Location: Right arm, Patient Position: Lying)  Pulse 68  Resp 14  SpO2 97%    Physical Exam:  General Appearance:    Alert, cooperative, no distress, appears stated age   Head:    Normocephalic, without obvious abnormality, atraumatic   Lungs:     Clear to auscultation bilaterally, respirations unlabored    Heart:   Regular rate and rhythm, S1 and S2 normal, no murmur, rub    or gallop    Abdomen:    Soft, non-tender.  +bowel sounds   Breast Exam:    deferred   Genitalia:    deferred   Extremities:   Extremities normal, atraumatic, no  cyanosis or edema   Skin:   Skin color, texture, turgor normal, no rashes or lesions   Neurologic:   Grossly intact   Results Review  I reviewed the patient's new clinical results.    Cancer Staging (if applicable)  Cancer Patient: __ yes _x_no __unknown; If yes, clinical stage T:__ N:__M:__, stage group or __N/A    Impression/Plan:  Pain due to total right hip replacement and here today for right hip revision arthroplasty anterior.  S/P Trochanteric nail of right hip and then underwent removal of trochanteric nail of right hip and right total hip replacement 2007.      Chelo Ugalde, APRN   2017   8:16 AM     Electronically signed by Julio Sawyer MD at 2017  9:33 AM      Cipriano Davalos MD at 2017  2:20 PM          Patient Name: Ezekiel Reyes  MRN: [id number]  : 1965  DOS: 2017    Attending: Julio Sawyer MD    Primary Care Provider: Julio Sawyer MD      Chief complaint:  Right hip pain    Subjective   Patient is a 52 y.o. male presented for revision of right total hip arthroplasty by Dr. Sawyer under GA. He tolerated surgery well and is admitted for further medical management.    Per 's note:   ( 52-year-old with long history of right hip issues. He describes avascular necrosis treated with core decompression approximately 10 years ago. This was complicated by intertrochanteric fracture and nail fixation. Several years ago this was removed and a right total hip replacement was placed.  There is overall improvement in his symptoms but he continues to have popping sensation and pain in the right hip. X-rays show possible osteolysis proximally around the fully coated stem. Metal on metal acetabular liner is in place. There is no gross osteolytic process. Risks and benefits indications and rationale for revision total hip arthroplasty including any variation of acetabular liner exchange and acetabular or femoral component revision discussed with patient to wishes to  "proceed with surgery for pain relief. He signs his own consent after all questions are answered.)     (When seen in his room afterwards, he c/o postop pain. Though he is still under effect of spinal anesthesia. He reports being chronically on Lortab and that it doesn't seem to be helping. Prefers to take percocet for pain control. No f/c/n/vom/sob. No cp /orthopnea/pnd. No LE swelling.  wy)    Allergies:  No Known Allergies    Meds:  Prescriptions Prior to Admission   Medication Sig Dispense Refill Last Dose   • pantoprazole (PROTONIX) 20 MG EC tablet Take 20 mg by mouth Daily.      • HYDROcodone-acetaminophen (NORCO) 7.5-325 MG per tablet Take 1 tablet by mouth 2 (Two) Times a Day As Needed for Moderate Pain .          History:   Past Medical History:   Diagnosis Date   • Acid reflux    • Arthritis    • Wears dentures    • Wears glasses      Past Surgical History:   Procedure Laterality Date   • CARDIAC CATHETERIZATION     • CHOLECYSTECTOMY     • COLONOSCOPY     • JOINT REPLACEMENT Right     Trochanteric nailing then removal with Right FRANKO   • JOINT REPLACEMENT Left    • SHOULDER SURGERY       History reviewed. No pertinent family history.  Social History   Substance Use Topics   • Smoking status: Current Every Day Smoker     Packs/day: 0.25     Years: 3.00     Types: Cigarettes   • Smokeless tobacco: Never Used   • Alcohol use No    for 2 years. Has 3 children from before. Disabled due to chronic back pain.     Review of Systems  Pertinent items are noted in HPI    Vital Signs  /88 (BP Location: Right arm, Patient Position: Lying)  Pulse 71  Temp 96.8 °F (36 °C) (Oral)   Resp 16  Ht 64\" (162.6 cm)  Wt 190 lb (86.2 kg)  SpO2 100%  BMI 32.61 kg/m2    Physical Exam:    General Appearance:    Alert, cooperative, in no acute distress   Head:    Normocephalic, without obvious abnormality, atraumatic   Eyes:            Lids and lashes normal, conjunctivae and sclerae normal, no   icterus, no pallor, " corneas clear    Ears:    Ears appear intact with no abnormalities noted   Neck:   No adenopathy, supple, trachea midline, no thyromegaly   Lungs:     Clear to auscultation,respirations regular, even and                   unlabored    Heart:    Regular rhythm and normal rate, normal S1 and S2, no            Murmur    Abdomen:     Normal bowel sounds, no masses, no organomegaly, soft        non-tender, non-distended, no guarding, no rebound                 tenderness   Genitalia:    Deferred   Extremities:   Long lateral right hip incision, CDI with prineo on , no edema, no cyanosis, no  redness   Pulses:   Pulses palpable and equal bilaterally   Skin:   No bleeding, bruising or rash   Neurologic:   Cranial nerves 2 - 12 grossly intact, LE with decreased motor and sensory function due to SA effect.       I reviewed the patient's new clinical results.     Results for SHEY SAUCEDO (MRN [id number]) as of 9/11/2017 14:23   Ref. Range 8/29/2017 09:18   Glucose Latest Ref Range: 70 - 100 mg/dL 137 (H)   Sodium Latest Ref Range: 132 - 146 mmol/L 141   Potassium Latest Ref Range: 3.5 - 5.5 mmol/L 4.0   CO2 Latest Ref Range: 20.0 - 31.0 mmol/L 28.0   Chloride Latest Ref Range: 99 - 109 mmol/L 107   Anion Gap Latest Ref Range: 3.0 - 11.0 mmol/L 6.0   Creatinine Latest Ref Range: 0.60 - 1.30 mg/dL 0.80   BUN Latest Ref Range: 9 - 23 mg/dL 17   BUN/Creatinine Ratio Latest Ref Range: 7.0 - 25.0  21.3   Calcium Latest Ref Range: 8.7 - 10.4 mg/dL 9.3   eGFR Non African Amer Latest Ref Range: >60 mL/min/1.73 101   eGFR  African Amer Latest Ref Range: >60 mL/min/1.73 123   Hemoglobin A1C Latest Ref Range: 4.80 - 5.60 % 6.00 (H)   WBC Latest Ref Range: 3.50 - 10.80 10*3/mm3 6.22   RBC Latest Ref Range: 4.20 - 5.76 10*6/mm3 4.37   Hemoglobin Latest Ref Range: 13.1 - 17.5 g/dL 14.4   Hematocrit Latest Ref Range: 38.9 - 50.9 % 42.6   RDW Latest Ref Range: 11.3 - 14.5 % 13.5   MCV Latest Ref Range: 80.0 - 99.0 fL 97.5   MCH Latest Ref  Range: 27.0 - 31.0 pg 33.0 (H)   MCHC Latest Ref Range: 32.0 - 36.0 g/dL 33.8   MPV Latest Ref Range: 6.0 - 12.0 fL 10.8   Platelets Latest Ref Range: 150 - 450 10*3/mm3 199     Assessment and Plan:   Principal Problem:    Status post revision total replacement of right hip  Active Problems:    Pain due to total hip replacement    GERD (gastroesophageal reflux disease)    Prediabetes    Tobacco abuse      Plan  1. PT/OT- early ambulation post op  2. Pain control-prns   3. IS-encourage  4. DVT proph- mechs/ASA  5. Bowel regimen  6. Resume home medications as appropriate  7. Monitor post-op labs  8. DC planning     GERD  - Continue home protonix  Tobacco abuse  - Nicotine patch daily  PreDM  - hgb A1c on 8/29/17 6.0  - Accuchecks ACHS with low dose SSI  - DM educator consult    Will change PO prn pain med to percocet. wy    Seen and examined by me. Agree with above. Discussed with patient.     Cipriano Davalos MD  09/11/17  2:52 PM     Electronically signed by Cipriano Davalos MD at 9/11/2017  2:55 PM           Operative/Procedure Notes (all)      Julio Sawyer MD at 9/11/2017 12:19 PM  Version 2 of 2         TOTAL HIP ARTHROPLASTY ANTERIOR REVISION  Procedure Note    Ezekiel Reyes  9/11/2017    Pre-op Diagnosis:   Right hip painful total hip arthroplasty    Post-op Diagnosis:     Right hip painful total hip arthroplasty  Heterotopic ossification    Procedure/CPT® Codes:  48611 - Exchange to Polyethylene liner  Local excision heterotopic ossification    Procedure(s):  TOTAL HIP ARTHROPLASTY ANTERIOR REVISON RIGHT     Surgeon(s):  Julio Sawyer MD    Anesthesia: General with Block    Staff:   Circulator: Lianet Schwarz RN; Moy Gannon RN  Scrub Person: Erica Linton  Nursing Assistant: Kam Ham  Assistant: Treasure Larson CSA    Estimated Blood Loss: 200 mL  Urine Voided: * No values recorded between 9/11/2017 10:16 AM and 9/11/2017 12:13 PM *    Specimens:                  ID Type Source  Tests Collected by Time Destination   1 : RIGHT HIP JOINT SYNOVITUS Synovium Hip, Right ANAEROBIC CULTURE, FUNGAL CULTURE, TISSUE/BONE CULTURE, AFB CULTURE Julio Sawyer MD 9/11/2017 1128          Drains:           Findings: H.O. External capsule resected   No gross instability or obvious inflammation   Soft tissue between acetabular implant and liner sent for culture   Stable acetabular and femoral components    Complications: None    Implants:   Out: Depuy [place] 54/36 +2/neutral liner Metal-on-metal   In: Depuy [place] 54/36 neutral/neutral polyethylene liner   Replaced: +1.5/36 neck/head adapter   Maintained: Stable [place] acetabular shell and AML stem    Indications: 52-year-old with long history of right hip issues. He describes avascular necrosis treated with core decompression approximately 10 years ago. This was complicated by intertrochanteric fracture and nail fixation. Several years ago this was removed and a right total hip replacement was placed. There is overall improvement in his symptoms but he continues to have popping sensation and pain in the right hip. X-rays show possible osteolysis proximally around the fully coated stem. Metal on metal acetabular liner is in place. There is no gross osteolytic process. Risks and benefits indications and rationale for revision total hip arthroplasty including any variation of acetabular liner exchange and acetabular or femoral component revision discussed with patient to wishes to proceed with surgery for pain relief. He signs his own consent after all questions are answered.    Procedure: While in the OR spinal anesthesia was started. Satisfactory level was achieved. Turned to the right side up lateral decubitus position and prepped and draped in standard fashion with the right leg free. Old posterior incision was reentered. This allowed for adequate direct lateral approach to the hip capsule. Heterotopic ossification exterior to the capsule was  identified and resected. A T-shaped capsulotomy created. Minimal effusion identified. Femoral head was reasonably stable dislocated anteriorly and removed from the trunnion. Ultimately thorough evaluation of the femur showed no gross lytic process and no significant loosening and overall adequate position. Circumferential capsular release performed around acetabular component. Metal on metal acetabular liner was removed without difficulty. Soft tissue between the liner and acetabular shell was sent for culture. Trial showed full range of motion and good stability with the neutral neutral poly liner and the +1.5/36 neck and head adapter which was the same size removed. Wound was then thoroughly irrigated. Polyethylene placed without difficulty. Femoral head placed also without difficulty and reduced and showed same range of motion and stability as with the trial. Wound was then thoroughly irrigated and closed in layers without a drain.  Standard Prineo dressing applied. Patient stable to recovery having tolerated procedure well throughout with all counts correct.      Julio Sawyer MD     Date: 9/11/2017  Time: 12:18 PM           Electronically signed by Julio Sawyer MD at 9/11/2017  1:05 PM      Julio Sawyer MD at 9/11/2017 12:19 PM  Version 1 of 2         TOTAL HIP ARTHROPLASTY ANTERIOR REVISION  Procedure Note    Ezekiel Reyes  9/11/2017    Pre-op Diagnosis:   Right hip painful total hip arthroplasty    Post-op Diagnosis:     Right hip painful total hip arthroplasty  Heterotopic ossification    Procedure/CPT® Codes:  70061 - Exchange to Polyethylene liner  Local excision heterotopic ossification    Procedure(s):  TOTAL HIP ARTHROPLASTY ANTERIOR REVISON RIGHT     Surgeon(s):  Julio Sawyer MD    Anesthesia: General with Block    Staff:   Circulator: Lianet Schwarz RN; Moy Gannon RN  Scrub Person: Erica Linton  Nursing Assistant: Kam Ham  Assistant: SILAS James  "Blood Loss: 200 mL  Urine Voided: * No values recorded between 9/11/2017 10:16 AM and 9/11/2017 12:13 PM *    Specimens:                  ID Type Source Tests Collected by Time Destination   1 : RIGHT HIP JOINT SYNOVITUS Synovium Hip, Right ANAEROBIC CULTURE, FUNGAL CULTURE, TISSUE/BONE CULTURE, AFB CULTURE Julio Sawyer MD 9/11/2017 1128          Drains:           Findings: H.O. External capsule resected   No gross instability or obvious inflammation   Soft tissue between acetabular implant and liner sent for culture   Stable acetabular and femoral components    Complications: None    Implants:   Out: Depuy [place] 54/36 +2/neutral liner Metal-on-metal   In: Depuy [place] 54/36 neutral/neutral polyethylene liner   Replaced: +1.5/36 neck/head adapter   Maintained: Stable [place] acetabular shell and AML stem    Indications:    Procedure:      Julio Sawyer MD     Date: 9/11/2017  Time: 12:18 PM           Electronically signed by Julio Sawyer MD at 9/11/2017 12:30 PM           Physician Progress Notes (all)      Julio Sawyer MD at 9/12/2017  8:13 AM  Version 1 of 1         Ezekiel Reyes  [id number]  1965    /78  Pulse 83  Temp 98.3 °F (36.8 °C) (Oral)   Resp 16  Ht 64\" (162.6 cm)  Wt 190 lb (86.2 kg)  SpO2 96%  BMI 32.61 kg/m2    Lab Results (last 24 hours)     Procedure Component Value Units Date/Time    OR Potassium [[id number]]  (Normal) Collected:  09/11/17 0826    Specimen:  Blood from Arm, Left Updated:  09/11/17 0830     Potassium, OR 3.94 mmol/L     Anaerobic Culture [[id number]] Collected:  09/11/17 1128    Specimen:  Synovium from Hip, Right Updated:  09/11/17 1223    Fungus Culture [[id number]] Collected:  09/11/17 1128    Specimen:  Synovium from Hip, Right Updated:  09/11/17 1223    AFB Culture [[id number]] Collected:  09/11/17 1128    Specimen:  Synovium from Hip, Right Updated:  09/11/17 1223    POC Glucose Fingerstick [[id number]]  (Normal) Collected:  09/11/17 1227    Specimen: "  Blood Updated:  09/11/17 1228     Glucose 126 mg/dL     Narrative:       Meter: [id number] : [id number] Silverio BILLINGS    Tissue/Bone Culture [[id number]] Collected:  09/11/17 1128    Specimen:  Synovium from Hip, Right Updated:  09/11/17 1409     Gram Stain Result No WBCs or organisms seen    POC Glucose Fingerstick [[id number]]  (Normal) Collected:  09/11/17 1558    Specimen:  Blood Updated:  09/11/17 1559     Glucose 105 mg/dL     Narrative:       Meter: [id number] : [id number] Mihsagunnar Boles    POC Glucose Fingerstick [[id number]]  (Abnormal) Collected:  09/11/17 2213    Specimen:  Blood Updated:  09/11/17 2225     Glucose 161 (H) mg/dL     Narrative:       Verify with Lab Meter: [id number] : [id number] Brandon Olimpiastan    POC Glucose Fingerstick [[id number]]  (Normal) Collected:  09/12/17 0747    Specimen:  Blood Updated:  09/12/17 0809     Glucose 127 mg/dL     Narrative:       Meter: [id number] : [id number] Edenjuan f Moscoso          Patient Care Team:  Julio Sawyer MD as PCP - General (Orthopedic Surgery)    SUBJECTIVE  Pain worse this morning.  Excellent start in physical therapy.    PHYSICAL EXAM  Incision benign  Minimal thigh swelling  Neurovascularly intact to knees and toes     Principal Problem:    Status post revision total replacement of right hip  Active Problems:    Pain due to total hip replacement    GERD (gastroesophageal reflux disease)    Prediabetes    Tobacco abuse      PLAN / DISPOSITION:  Hemoglobin pending - no transfusion anticipated  Discharge home today if adequate pain control with ambulation    Julio Sawyer MD  09/12/17  8:13 AM     Electronically signed by Julio Sawyer MD at 9/12/2017  8:15 AM        Consult Notes (all)     No notes of this type exist for this encounter.           Physical Therapy Notes (last 24 hours) (Notes from 9/11/2017 12:24 PM through 9/12/2017 12:24 PM)      Becca Enrique, PT at 9/11/2017  5:09 PM  Version 1 of 1         Problem: Patient Care  Overview (Adult)  Goal: Plan of Care Review  Outcome: Ongoing (interventions implemented as appropriate)    09/11/17 1620   Coping/Psychosocial Response Interventions   Plan Of Care Reviewed With patient;spouse   Patient Care Overview   Progress progress toward functional goals as expected   Outcome Evaluation   Outcome Summary/Follow up Plan Patient ambulated 500 feet with RW and CGA, only required SBA for t/f and bed mobility. Plan is d/c home tomorrow. Will progress mobility and strength as able, progress to stair training in AM.          Problem: Hip Replacement, Total (Adult)  Goal: Signs and Symptoms of Listed Potential Problems Will be Absent or Manageable (Hip Replacement, Total)  Outcome: Ongoing (interventions implemented as appropriate)    09/11/17 1620   Hip Replacement, Total   Problems Assessed (Total Hip Replacement) pain;displacement of prosthesis;functional decline/self care deficit   Problems Present (Total Hip Replacement) pain;displacement of prosthesis;functional decline/self care deficit         Problem: Inpatient Physical Therapy  Goal: Bed Mobility Goal LTG- PT  Outcome: Ongoing (interventions implemented as appropriate)    09/11/17 1620   Bed Mobility PT LTG   Bed Mobility PT LTG, Date Established 09/11/17   Bed Mobility PT LTG, Time to Achieve 5 days   Bed Mobility PT LTG, Activity Type supine to sit/sit to supine   Bed Mobility PT LTG, [place] Level conditional independence   Bed Mobility PT LTG, Date Goal Reviewed 09/11/17   Bed Mobility PT LTG, Outcome goal ongoing       Goal: Transfer Training Goal 1 LTG- PT  Outcome: Ongoing (interventions implemented as appropriate)    09/11/17 1620   Transfer Training PT LTG   Transfer Training PT LTG, Date Established 09/11/17   Transfer Training PT LTG, Time to Achieve 5 days   Transfer Training PT LTG, Activity Type sit to stand/stand to sit   Transfer Training PT LTG, [place] Level conditional independence   Transfer Training PT LTG,  Assist Device walker, rolling   Transfer Training PT LTG, Date Goal Reviewed 09/11/17   Transfer Training PT LTG, Outcome goal ongoing       Goal: Gait Training Goal LTG- PT  Outcome: Ongoing (interventions implemented as appropriate)    09/11/17 1620   Gait Training PT LTG   Gait Training Goal PT LTG, Date Established 09/11/17   Gait Training Goal PT LTG, Time to Achieve 5 days   Gait Training Goal PT LTG, [place] Level conditional independence   Gait Training Goal PT LTG, Assist Device walker, rolling   Gait Training Goal PT LTG, Distance to Achieve 750 feet   Gait Training Goal PT LTG, Date Goal Reviewed 09/11/17   Gait Training Goal PT LTG, Outcome goal ongoing       Goal: Stair Training Goal STG- PT  Outcome: Ongoing (interventions implemented as appropriate)    09/11/17 1620   Stair Training PT STG   Stair Training Goal PT STG, Date Established 09/11/17   Stair Training Goal PT STG, Time to Achieve 5 days   Stair Training Goal PT STG, Number of Steps 1   Stair Training Goal PT STG, [place] Level conditional independence   Stair Training Goal PT STG, Assist Device walker, rolling  (backwards)   Stair Training Goal PT STG, Date Goal Reviewed 09/11/17   Stair Training Goal PT STG, Outcome goal ongoing       Goal: Stair Training Goal LTG- PT  Outcome: Ongoing (interventions implemented as appropriate)    09/11/17 1620   Stair Training PT LTG   Stair Training Goal PT LTG, Date Established 09/11/17   Stair Training Goal PT LTG, Time to Achieve 5 days   Stair Training Goal PT LTG, Number of Steps 3   Stair Training Goal PT LTG, [place] Level contact guard assist   Stair Training Goal PT LTG, Assist Device 1 handrail;cane, straight   Stair Training Goal PT LTG, Date Goal Reviewed 09/11/17   Stair Training Goal PT LTG, Outcome goal ongoing              Electronically signed by Becca Enrique, PT at 9/11/2017  5:09 PM      Becca Enrique, PT at 9/11/2017  5:10 PM  Version 1 of 1          Acute Care - Physical Therapy Initial Evaluation  [facility]     Patient Name: Ezekiel Reyes  : 1965  MRN: [id number]  Today's Date: 2017   Onset of Illness/Injury or Date of Surgery Date: 17  Date of Referral to PT: 17  Referring Physician: MD Silverio      Admit Date: 2017     Visit Dx:    ICD-10-CM ICD-9-CM   1. Impaired functional mobility, balance, gait, and endurance Z74.09 V49.89   2. Hip pain, right M25.551 719.45     Patient Active Problem List   Diagnosis   • Pain due to total hip replacement   • Status post revision total replacement of right hip   • GERD (gastroesophageal reflux disease)   • Prediabetes   • Tobacco abuse     Past Medical History:   Diagnosis Date   • Acid reflux    • Arthritis    • Wears dentures    • Wears glasses      Past Surgical History:   Procedure Laterality Date   • CARDIAC CATHETERIZATION     • CHOLECYSTECTOMY     • COLONOSCOPY     • JOINT REPLACEMENT Right     Trochanteric nailing then removal with Right FRANKO   • JOINT REPLACEMENT Left    • SHOULDER SURGERY            PT ASSESSMENT (last 72 hours)      PT Evaluation       17 1620 17 0809    Rehab Evaluation    Document Type evaluation  -LR     Subjective Information agree to therapy;complains of;pain  -LR     Patient Effort, Rehab Treatment excellent  -LR     Symptoms Noted During/After Treatment none  -LR     General Information    Patient Profile Review yes  -LR     Onset of Illness/Injury or Date of Surgery Date 17  -LR     Referring Physician MD Silverio  -LR     General Observations Patient supine in bed upon arrival. IV, SCDs. Wife present.   -LR     Pertinent History Of Current Problem Patient presents for surgical management of persistent and progressive R hip pain and dysfunction, s/p R FRANKO and intertrochanteric nail removal in .   -LR     Precautions/Limitations fall precautions;hip precautions- right;other (see comments)   impulsive  -LR     Prior Level of Function  min assist:;all household mobility;community mobility;gait;transfer;bed mobility;home management;cooking;cleaning;shopping;using stairs;independent:;driving;yard work   all mobility limited by pain  -LR     Equipment Currently Used at Home none  -LR none  -DB    Plans/Goals Discussed With patient;spouse/S.O.;agreed upon  -LR     Risks Reviewed patient:;spouse/S.O.:;LOB;nausea/vomiting;dizziness;increased discomfort;lines disloged  -LR     Benefits Reviewed patient:;spouse/S.O.:;improve function;increase independence;increase strength;increase balance;decrease pain;increase knowledge  -LR     Barriers to Rehab previous functional deficit  -LR     Living Environment    Lives With spouse;child(malena), dependent  -LR spouse;child(malena), dependent;child(malena), adult  -DB    Living Arrangements house  -LR house  -DB    Home Accessibility bed and bath on same level;house is not wheelchair accessible;stairs to enter home;tub/shower is not walk in  -LR no concerns  -DB    Number of Stairs to Enter Home 4   3 steps onto porch, 1 step into home  -LR 7  -DB    Number of Stairs Within Home --   to basement, does not have to access   -LR 15  -DB    Stair Railings at Home outside, present on right side  -LR outside, present at both sides  -DB    Type of Financial/Environmental Concern none  -LR none  -DB    Transportation Available family or friend will provide  -LR family or friend will provide;public transportation  -DB    Living Environment Comment Patient's wife available to assist for the first week, then has to return to work.   -LR na  -DB    Clinical Impression    Date of Referral to PT 09/11/17  -LR     PT Diagnosis s/p R FRANKO revision  -LR     Prognosis good  -LR     Patient/Family Goals Statement go home, decrease pain  -LR     Criteria for Skilled Therapeutic Interventions Met yes;treatment indicated  -LR     Rehab Potential good, to achieve stated therapy goals  -LR     Pain Assessment    Pain Assessment 0-10  -LR     Pain  Score 10  -LR     Post Pain Score 10  -LR     Pain Type Acute pain  -LR     Pain Location Hip  -LR     Pain Orientation Right  -LR     Pain Intervention(s) Repositioned;Ambulation/increased activity  -LR     Vision Assessment/Intervention    Visual Impairment WFL  -LR     Cognitive Assessment/Intervention    Current Cognitive/Communication Assessment functional  -LR     Orientation Status oriented x 4;required verbal cueing (specifiy in comments)  -LR     Follows Commands/Answers Questions 75% of the time;able to follow single-step instructions;needs cueing;needs repetition   decreased safety awareness.   -LR     Personal Safety mild impairment;at risk behaviors demonstrated;decreased awareness, need for assist;decreased awareness, need for safety;decreased insight to deficits;impulsive  -LR     ROM (Range of Motion)    General ROM lower extremity range of motion deficits identified  -LR     General LE Assessment    ROM LLE ROM was WFL;hip, right: LE ROM deficit  -LR     ROM Detail R hip AROM impaired 25%  -LR     MMT (Manual Muscle Testing)    General MMT Assessment lower extremity strength deficits identified  -LR     Lower Extremity    Lower Ext Manual Muscle Testing left LE strength is WFL;right hip strength deficit  -LR     Lower Ext Manual Muscle Testing Detail R hip functionally 4-/5  -LR     Mobility Assessment/Training    Extremity Weight-Bearing Status right lower extremity  -LR     Right Lower Extremity Weight-Bearing weight-bearing as tolerated  -LR     Bed Mobility, Assessment/Treatment    Bed Mobility, Assistive Device head of bed elevated;bed rails  -LR     Bed Mob, Supine to Sit, [place] supervision required  -LR     Bed Mob, Sit to Supine, [place] not tested   UIC at end of eval.   -LR     Bed Mobility, Impairments ROM decreased;strength decreased;pain  -LR     Transfer Assessment/Treatment    Transfers, Sit-Stand [place] verbal cues required;stand by assist  -LR     Transfers,  Stand-Sit [place] verbal cues required;stand by assist  -LR     Transfers, Sit-Stand-Sit, Assist Device rolling walker  -LR     Transfer, Safety Issues sequencing ability decreased;step length decreased;weight-shifting ability decreased  -LR     Transfer, Impairments ROM decreased;strength decreased;pain  -LR     Transfer, Comment Verbal cues for correct hand placement with t/f and to step L LE out before t/f for comfort.   -LR     Gait Assessment/Treatment    Gait, [place] Level verbal cues required;contact guard assist;2 person assist required  -LR     Gait, Assistive Device rolling walker  -LR     Gait, Distance (Feet) 500  -LR     Gait, Gait Pattern Analysis swing-through gait  -LR     Gait, Gait Deviations right:;antalgic;toe-to-floor clearance decreased  -LR     Gait, Safety Issues weight-shifting ability decreased  -LR     Gait, Impairments ROM decreased;strength decreased;pain  -LR     Gait, Comment Patient ambulated with step through gait pattern at fast pace. Verbal cues for increased R knee flexion during swing phase. Improved with cues for correction. Gait limited by pain.   -LR     Therapy Exercises    Exercise Protocols total hip   revision  -LR     Total Hip Exercises right:;10 reps;ankle pumps/circles  -LR     Sensory Assessment/Intervention    Sensory Impairment --   denies numbness/tingling; actively DF bilaterally  -LR     Positioning and Restraints    Pre-Treatment Position in bed  -LR     Post Treatment Position chair  -LR     In Chair notified nsg;reclined;sitting;call light within reach;encouraged to call for assist;with family/caregiver;legs elevated  -LR       User Key  (r) = Recorded By, (t) = Taken By, (c) = Cosigned By    Initials Name Provider Type    LR Becca Enrique, WANG Physical Therapist    JARON Spann, RN Registered Nurse          Physical Therapy Education     Title: PT OT SLP Therapies (Done)     Topic: Physical Therapy (Done)     Point: Mobility training  (Done)    Learning Progress Summary    Learner Readiness Method Response Comment Documented by Status   Patient Acceptance E,D VU,NR Educated on hip precautions and safety with mobility. LR 09/11/17 1703 Done               Point: Home exercise program (Done)    Learning Progress Summary    Learner Readiness Method Response Comment Documented by Status   Patient Acceptance E,D VU,NR Educated on hip precautions and safety with mobility. LR 09/11/17 1703 Done               Point: Body mechanics (Done)    Learning Progress Summary    Learner Readiness Method Response Comment Documented by Status   Patient Acceptance E,D VU,NR Educated on hip precautions and safety with mobility. LR 09/11/17 1703 Done               Point: Precautions (Done)    Learning Progress Summary    Learner Readiness Method Response Comment Documented by Status   Patient Acceptance E,D VU,NR Educated on hip precautions and safety with mobility. LR 09/11/17 1703 Done                      User Key     Initials Effective Dates Name Provider Type Discipline    LR 06/19/15 -  Becca Enrique, PT Physical Therapist PT                PT Recommendation and Plan  Anticipated Equipment Needs At Discharge: front wheeled walker, bedside commode, tub bench, standard cane  Anticipated Discharge Disposition: home with assist, home with home health  Planned Therapy Interventions: balance training, bed mobility training, gait training, home exercise program, patient/family education, ROM (Range of Motion), stair training, strengthening, transfer training  PT Frequency: 2 times/day  Plan of Care Review  Plan Of Care Reviewed With: patient, spouse  Progress: progress toward functional goals as expected  Outcome Summary/Follow up Plan: Patient ambulated 500 feet with RW and CGA, only required SBA for t/f and bed mobility. Plan is d/c home tomorrow. Will progress mobility and strength as able, progress to stair training in AM.           IP PT Goals       09/11/17  1620          Bed Mobility PT LTG    Bed Mobility PT LTG, Date Established 09/11/17  -LR      Bed Mobility PT LTG, Time to Achieve 5 days  -LR      Bed Mobility PT LTG, Activity Type supine to sit/sit to supine  -LR      Bed Mobility PT LTG, [place] Level conditional independence  -LR      Bed Mobility PT LTG, Date Goal Reviewed 09/11/17  -LR      Bed Mobility PT LTG, Outcome goal ongoing  -LR      Transfer Training PT LTG    Transfer Training PT LTG, Date Established 09/11/17  -LR      Transfer Training PT LTG, Time to Achieve 5 days  -LR      Transfer Training PT LTG, Activity Type sit to stand/stand to sit  -LR      Transfer Training PT LTG, [place] Level conditional independence  -LR      Transfer Training PT LTG, Assist Device walker, rolling  -LR      Transfer Training PT  LTG, Date Goal Reviewed 09/11/17  -LR      Transfer Training PT LTG, Outcome goal ongoing  -LR      Gait Training PT LTG    Gait Training Goal PT LTG, Date Established 09/11/17  -LR      Gait Training Goal PT LTG, Time to Achieve 5 days  -LR      Gait Training Goal PT LTG, [place] Level conditional independence  -LR      Gait Training Goal PT LTG, Assist Device walker, rolling  -LR      Gait Training Goal PT LTG, Distance to Achieve 750 feet  -LR      Gait Training Goal PT LTG, Date Goal Reviewed 09/11/17  -LR      Gait Training Goal PT LTG, Outcome goal ongoing  -LR      Stair Training PT STG    Stair Training Goal PT STG, Date Established 09/11/17  -LR      Stair Training Goal PT STG, Time to Achieve 5 days  -LR      Stair Training Goal PT STG, Number of Steps 1  -LR      Stair Training Goal PT STG, [place] Level conditional independence  -LR      Stair Training Goal PT STG, Assist Device walker, rolling   backwards  -LR      Stair Training Goal PT STG, Date Goal Reviewed 09/11/17  -LR      Stair Training Goal PT STG, Outcome goal ongoing  -LR      Stair Training PT LTG    Stair Training Goal PT LTG, Date Established  09/11/17  -LR      Stair Training Goal PT LTG, Time to Achieve 5 days  -LR      Stair Training Goal PT LTG, Number of Steps 3  -LR      Stair Training Goal PT LTG, [place] Level contact guard assist  -LR      Stair Training Goal PT LTG, Assist Device 1 handrail;cane, straight  -LR      Stair Training Goal PT LTG, Date Goal Reviewed 09/11/17  -LR      Stair Training Goal PT LTG, Outcome goal ongoing  -LR        User Key  (r) = Recorded By, (t) = Taken By, (c) = Cosigned By    Initials Name Provider Type    BARTOLO Enrique PT Physical Therapist                Outcome Measures       09/11/17 1620          How much help from another person do you currently need...    Turning from your back to your side while in flat bed without using bedrails? 3  -LR      Moving from lying on back to sitting on the side of a flat bed without bedrails? 3  -LR      Moving to and from a bed to a chair (including a wheelchair)? 3  -LR      Standing up from a chair using your arms (e.g., wheelchair, bedside chair)? 3  -LR      Climbing 3-5 steps with a railing? 3  -LR      To walk in hospital room? 3  -LR      AM-PAC 6 Clicks Score 18  -LR      Functional Assessment    Outcome Measure Options AM-PAC 6 Clicks Basic Mobility (PT)  -LR        User Key  (r) = Recorded By, (t) = Taken By, (c) = Cosigned By    Initials Name Provider Type    BARTOLO Enrique PT Physical Therapist           Time Calculation:         PT Charges       09/11/17 1710          Time Calculation    Start Time 1620  -LR      PT Received On 09/11/17  -LR      PT Goal Re-Cert Due Date 09/21/17  -LR      Time Calculation- PT    Total Timed Code Minutes- PT 15 minute(s)  -LR        User Key  (r) = Recorded By, (t) = Taken By, (c) = Cosigned By    Initials Name Provider Type    BARTOLO Enrique PT Physical Therapist          Therapy Charges for Today     Code Description Service Date Service Provider Modifiers Qty    19702140063 HC GAIT TRAINING  EA 15 MIN 9/11/2017 Becca Enrique, PT GP 1    56428201397 HC PT THER SUPP EA 15 MIN 9/11/2017 Becca Enrique, PT GP 2    67975165833 HC PT EVAL LOW COMPLEXITY 3 9/11/2017 Becca Enrique, PT GP 1          PT G-Codes  Outcome Measure Options: AM-PAC 6 Clicks Basic Mobility (PT)      Becca Enrique, PT  9/11/2017            Electronically signed by Becca Enrique PT at 9/11/2017  5:10 PM      Billy Herrera PT at 9/12/2017 11:05 AM  Version 1 of 1         Problem: Patient Care Overview (Adult)  Goal: Plan of Care Review  Outcome: Ongoing (interventions implemented as appropriate)    09/12/17 1103   Coping/Psychosocial Response Interventions   Plan Of Care Reviewed With patient   Patient Care Overview   Progress improving   Outcome Evaluation   Outcome Summary/Follow up Plan Pt ambulated 450 feet with SBA and RW and progressed to stair training this date. Pt anticipates discharge home today with HHPT, made good progress toward goals during inpatient stay         Problem: Inpatient Physical Therapy  Goal: Bed Mobility Goal LTG- PT  Outcome: Outcome(s) achieved Date Met:  09/12/17 09/11/17 1620 09/12/17 1103   Bed Mobility PT LTG   Bed Mobility PT LTG, Date Established 09/11/17 --    Bed Mobility PT LTG, Time to Achieve 5 days --    Bed Mobility PT LTG, Activity Type supine to sit/sit to supine --    Bed Mobility PT LTG, [place] Level conditional independence --    Bed Mobility PT LTG, Date Goal Reviewed --  09/12/17   Bed Mobility PT LTG, Outcome --  goal met       Goal: Transfer Training Goal 1 LTG- PT  Outcome: Unable to achieve outcome(s) by discharge Date Met:  09/12/17 09/11/17 1620 09/12/17 1103   Transfer Training PT LTG   Transfer Training PT LTG, Date Established 09/11/17 --    Transfer Training PT LTG, Time to Achieve 5 days --    Transfer Training PT LTG, Activity Type sit to stand/stand to sit --    Transfer Training PT LTG, [place] Level conditional  independence --    Transfer Training PT LTG, Assist Device walker, rolling --    Transfer Training PT LTG, Date Goal Reviewed --  09/12/17   Transfer Training PT LTG, Outcome --  goal not met   Transfer Training PT LTG, Reason Goal Not Met --  discharged from facility       Goal: Gait Training Goal LTG- PT  Outcome: Unable to achieve outcome(s) by discharge Date Met:  09/12/17 09/11/17 1620 09/12/17 1103   Gait Training PT LTG   Gait Training Goal PT LTG, Date Established 09/11/17 --    Gait Training Goal PT LTG, Time to Achieve 5 days --    Gait Training Goal PT LTG, [place] Level conditional independence --    Gait Training Goal PT LTG, Assist Device walker, rolling --    Gait Training Goal PT LTG, Distance to Achieve 750 feet --    Gait Training Goal PT LTG, Date Goal Reviewed --  09/12/17   Gait Training Goal PT LTG, Outcome --  goal not met   Gait Training Goal PT LTG, Reason Goal Not Met --  discharged from facility       Goal: Stair Training Goal STG- PT  Outcome: Unable to achieve outcome(s) by discharge Date Met:  09/12/17 09/11/17 1620 09/12/17 1103   Stair Training PT STG   Stair Training Goal PT STG, Date Established 09/11/17 --    Stair Training Goal PT STG, Time to Achieve 5 days --    Stair Training Goal PT STG, Number of Steps 1 --    Stair Training Goal PT STG, [place] Level conditional independence --    Stair Training Goal PT STG, Assist Device walker, rolling  (backwards) --    Stair Training Goal PT STG, Date Goal Reviewed --  09/12/17   Stair Training Goal PT STG, Outcome --  goal not met   Stair Training Goal PT STG, Reason Goal Not Met --  discharged from facility       Goal: Stair Training Goal LTG- PT  Outcome: Unable to achieve outcome(s) by discharge Date Met:  09/12/17 09/11/17 1620 09/12/17 1103   Stair Training PT LTG   Stair Training Goal PT LTG, Date Established 09/11/17 --    Stair Training Goal PT LTG, Time to Achieve 5 days --    Stair Training Goal PT LTG,  Number of Steps 3 --    Stair Training Goal PT LTG, [place] Level contact guard assist --    Stair Training Goal PT LTG, Assist Device 1 handrail;cane, straight --    Stair Training Goal PT LTG, Date Goal Reviewed --  17   Stair Training Goal PT LTG, Outcome --  goal not met   Stair Training Goal PT LTG, Reason Goal Not Met --  discharged from facility              Electronically signed by Billy Herrera PT at 2017 11:05 AM      Billy Herrera, PT at 2017 11:06 AM  Version 1 of 1         Acute Care - Physical Therapy Treatment Note/Discharge   [place]     Patient Name: Ezekiel Reyes  : 1965  MRN: [id number]  Today's Date: 2017  Onset of Illness/Injury or Date of Surgery Date: 17  Date of Referral to PT: 17  Referring Physician: Dr. Sawyer    Admit Date: 2017    Visit Dx:    ICD-10-CM ICD-9-CM   1. Impaired functional mobility, balance, gait, and endurance Z74.09 V49.89   2. Hip pain, right M25.551 719.45   3. Status post revision total replacement of right hip Z96.641 V43.64   4. Impaired mobility and ADLs Z74.09 799.89     Patient Active Problem List   Diagnosis   • Pain due to total hip replacement   • Status post revision total replacement of right hip   • GERD (gastroesophageal reflux disease)   • Prediabetes   • Tobacco abuse       Physical Therapy Education     Title: PT OT SLP Therapies (Done)     Topic: Physical Therapy (Done)     Point: Mobility training (Done)    Learning Progress Summary    Learner Readiness Method Response Comment Documented by Status   Patient Acceptance BASILIA,AYESHA,H KENY Reviewed hip precautions, gait mechanics, HEP, stairs, car transfer. Issued HEP handout  17 1102 Done    Acceptance AYESHA CUI,YOVANNY Educated on hip precautions and safety with mobility. LR 17 1703 Done   Significant Other Acceptance AYESHA CUI,H KENY Reviewed hip precautions, gait mechanics, HEP, stairs, car transfer. Issued HEP handout  17 1102 Done               Point:  Home exercise program (Done)    Learning Progress Summary    Learner Readiness Method Response Comment Documented by Status   Patient Acceptance E,D,H VU Reviewed hip precautions, gait mechanics, HEP, stairs, car transfer. Issued HEP handout  09/12/17 1102 Done    Acceptance E,D VU,NR Educated on hip precautions and safety with mobility.  09/11/17 1703 Done   Significant Other Acceptance E,D,H VU Reviewed hip precautions, gait mechanics, HEP, stairs, car transfer. Issued HEP handout  09/12/17 1102 Done               Point: Body mechanics (Done)    Learning Progress Summary    Learner Readiness Method Response Comment Documented by Status   Patient Acceptance E,D,H VU Reviewed hip precautions, gait mechanics, HEP, stairs, car transfer. Issued HEP handout  09/12/17 1102 Done    Acceptance E,D VU,NR Educated on hip precautions and safety with mobility.  09/11/17 1703 Done   Significant Other Acceptance E,D,H VU Reviewed hip precautions, gait mechanics, HEP, stairs, car transfer. Issued HEP handout  09/12/17 1102 Done               Point: Precautions (Done)    Learning Progress Summary    Learner Readiness Method Response Comment Documented by Status   Patient Acceptance E,D,H VU Reviewed hip precautions, gait mechanics, HEP, stairs, car transfer. Issued HEP handout  09/12/17 1102 Done    Acceptance E,D KENY,NR Educated on hip precautions and safety with mobility.  09/11/17 1703 Done   Significant Other Acceptance E,D,H VU Reviewed hip precautions, gait mechanics, HEP, stairs, car transfer. Issued HEP handout  09/12/17 1102 Done                      User Key     Initials Effective Dates Name Provider Type Discipline     06/19/15 -  Becca Enrique, PT Physical Therapist PT     03/14/16 -  Billy Herrera, PT Physical Therapist PT                    IP PT Goals       09/12/17 1103 09/11/17 1620       Bed Mobility PT LTG    Bed Mobility PT LTG, Date Established  09/11/17  -LR     Bed Mobility PT LTG,  Time to Achieve  5 days  -LR     Bed Mobility PT LTG, Activity Type  supine to sit/sit to supine  -LR     Bed Mobility PT LTG, [place] Level  conditional independence  -LR     Bed Mobility PT LTG, Date Goal Reviewed 09/12/17  - 09/11/17  -LR     Bed Mobility PT LTG, Outcome goal met  - goal ongoing  -LR     Transfer Training PT LTG    Transfer Training PT LTG, Date Established  09/11/17  -LR     Transfer Training PT LTG, Time to Achieve  5 days  -LR     Transfer Training PT LTG, Activity Type  sit to stand/stand to sit  -LR     Transfer Training PT LTG, [place] Level  conditional independence  -LR     Transfer Training PT LTG, Assist Device  walker, rolling  -LR     Transfer Training PT  LTG, Date Goal Reviewed 09/12/17  - 09/11/17  -LR     Transfer Training PT LTG, Outcome goal not met  - goal ongoing  -LR     Transfer Training PT LTG, Reason Goal Not Met discharged from facility  -      Gait Training PT LTG    Gait Training Goal PT LTG, Date Established  09/11/17  -LR     Gait Training Goal PT LTG, Time to Achieve  5 days  -LR     Gait Training Goal PT LTG, [place] Level  conditional independence  -LR     Gait Training Goal PT LTG, Assist Device  walker, rolling  -LR     Gait Training Goal PT LTG, Distance to Achieve  750 feet  -LR     Gait Training Goal PT LTG, Date Goal Reviewed 09/12/17  - 09/11/17  -LR     Gait Training Goal PT LTG, Outcome goal not met  - goal ongoing  -LR     Gait Training Goal PT LTG, Reason Goal Not Met discharged from facility  -      Stair Training PT STG    Stair Training Goal PT STG, Date Established  09/11/17  -LR     Stair Training Goal PT STG, Time to Achieve  5 days  -LR     Stair Training Goal PT STG, Number of Steps  1  -LR     Stair Training Goal PT STG, [place] Level  conditional independence  -LR     Stair Training Goal PT STG, Assist Device  walker, rolling   backwards  -LR     Stair Training Goal PT STG, Date Goal Reviewed 09/12/17  -  09/11/17  -     Stair Training Goal PT STG, Outcome goal not met  -MJ goal ongoing  -LR     Stair Training Goal PT STG, Reason Goal Not Met discharged from facility  -      Stair Training PT LTG    Stair Training Goal PT LTG, Date Established  09/11/17  -LR     Stair Training Goal PT LTG, Time to Achieve  5 days  -LR     Stair Training Goal PT LTG, Number of Steps  3  -LR     Stair Training Goal PT LTG, [place] Level  contact guard assist  -LR     Stair Training Goal PT LTG, Assist Device  1 handrail;cane, straight  -LR     Stair Training Goal PT LTG, Date Goal Reviewed 09/12/17  - 09/11/17  -LR     Stair Training Goal PT LTG, Outcome goal not met  - goal ongoing  -LR     Stair Training Goal PT LTG, Reason Goal Not Met discharged from facility  -        User Key  (r) = Recorded By, (t) = Taken By, (c) = Cosigned By    Initials Name Provider Type     Becca Enrique, PT Physical Therapist    JJ Herrera, PT Physical Therapist              Adult Rehabilitation Note       09/12/17 1005          Rehab Assessment/Intervention    Discipline physical therapist  -      Document Type therapy note (daily note);discharge summary  -      Subjective Information agree to therapy;complains of;pain  -MJ      Patient Effort, Rehab Treatment good  -MJ      Symptoms Noted During/After Treatment increased pain  -MJ      Symptoms Noted Comment RN notified  -MJ      Precautions/Limitations fall precautions;hip precautions- right  -MJ      Recorded by [MJ] Billy Herrera, PT      Pain Assessment    Pain Assessment 0-10  -MJ      Pain Score 2  -MJ      Post Pain Score 8  -MJ      Pain Type Acute pain  -MJ      Pain Location Hip  -MJ      Pain Orientation Right  -MJ      Pain Intervention(s) Repositioned;Ambulation/increased activity  -MJ      Recorded by [JJ] Billy Herrera, PT      Cognitive Assessment/Intervention    Current Cognitive/Communication Assessment functional  -MJ      Orientation Status oriented x 4   -      Follows Commands/Answers Questions 100% of the time;able to follow single-step instructions;needs cueing;needs repetition  -MJ      Personal Safety mild impairment  -MJ      Recorded by [MJ] Billy Herrera, PT      Mobility Assessment/Training    Extremity Weight-Bearing Status right lower extremity  -MJ      Right Lower Extremity Weight-Bearing weight-bearing as tolerated  -MJ      Recorded by [MJ] Billy Herrera PT      Bed Mobility, Assessment/Treatment    Bed Mobility, Assistive Device bed rails;head of bed elevated  -MJ      Bed Mob, Supine to Sit, [place] conditional independence;verbal cues required  -MJ      Bed Mob, Sit to Supine, [place] conditional independence;verbal cues required  -MJ      Bed Mobility, Safety Issues decreased use of legs for bridging/pushing  -MJ      Bed Mobility, Impairments ROM decreased;strength decreased;pain  -MJ      Bed Mobility, Comment Verbal cues for sequencing, increased time and effort to perform  -MJ      Recorded by [MJ] Billy Herrera PT      Transfer Assessment/Treatment    Transfers, Sit-Stand [place] supervision required;verbal cues required  -MJ      Transfers, Stand-Sit [place] supervision required;verbal cues required  -MJ      Transfers, Sit-Stand-Sit, Assist Device rolling walker  -      Transfer, Safety Issues step length decreased;weight-shifting ability decreased  -      Transfer, Impairments ROM decreased;strength decreased;pain  -MJ      Transfer, Comment Verbal cues for correct hand placement and to step R LE out prior to t/f.   -MJ      Recorded by [MJ] Billy Herrera, WANG      Gait Assessment/Treatment    Gait, [place] Level stand by assist;verbal cues required  -MJ      Gait, Assistive Device rolling walker  -MJ      Gait, Distance (Feet) 450  -      Gait, Gait Pattern Analysis swing-through gait  -      Gait, Gait Deviations right:;antalgic;ricardo decreased;step length decreased;weight-shifting ability decreased  -       Gait, Safety Issues step length decreased;weight-shifting ability decreased  -      Gait, Impairments ROM decreased;strength decreased;pain  -      Gait, Comment Pt demo step through gait pattern. Verbal cues to bend knee during swing to elicit heel strike and to increase LE weight bearing, improvement noted. Cues to  feet during turn. Gait limited by pain and fatigue  -      Recorded by [MJ] Billy Herrera PT      Stairs Assessment/Treatment    Number of Stairs 4  -      Stairs, Handrail Location both sides  -      Stairs, [place] Level contact guard assist;verbal cues required  -      Stairs, Assistive Device walker  -      Stairs, Technique Used step to step (ascending);step to step (descending)  -      Stairs, Safety Issues sequencing ability decreased;weight-shifting ability decreased  -      Stairs, Impairments ROM decreased;strength decreased;pain  -      Stairs, Comment Pt performed non-reciprocally, 3 steps in sequence with HRs and 1 step backward with RW. Verbal cues to ascend with L LE and to descend leading with R LE  -      Recorded by [MJ] Billy Herrera PT      Therapy Exercises    Exercise Protocols total hip   revision  -      Total Hip Exercises left:;15 reps;completed protocol;with assist;ankle pumps/circles;quad set;glut set;heel slides;hip abduction;SAQ;LAQ;hip flexion;SLR   Cues for technique. Reyes w/ SLR  -      Recorded by [JJ] Billy Herrera PT      Positioning and Restraints    Pre-Treatment Position in bed  -      Post Treatment Position bed  -      In Bed notified nsg;supine;call light within reach;encouraged to call for assist;with family/caregiver  -      Recorded by [MJ] Billy Herrera PT        User Key  (r) = Recorded By, (t) = Taken By, (c) = Cosigned By    Initials Name Effective Dates    JJ Herrera PT 03/14/16 -           PT Recommendation and Plan  Anticipated Equipment Needs At Discharge: front wheeled walker, bedside commode, tub  bench, standard cane  Anticipated Discharge Disposition: home with assist, home with home health  Planned Therapy Interventions: balance training, bed mobility training, gait training, home exercise program, patient/family education, ROM (Range of Motion), stair training, strengthening, transfer training  PT Frequency: 2 times/day  Plan of Care Review  Plan Of Care Reviewed With: patient  Progress: improving  Outcome Summary/Follow up Plan: Pt ambulated 450 feet with SBA and RW and progressed to stair training this date. Pt anticipates discharge home today with HHPT, made good progress toward goals during inpatient stay          Outcome Measures       09/12/17 1005 09/11/17 1620       How much help from another person do you currently need...    Turning from your back to your side while in flat bed without using bedrails? 4  -MJ 3  -LR     Moving from lying on back to sitting on the side of a flat bed without bedrails? 4  -MJ 3  -LR     Moving to and from a bed to a chair (including a wheelchair)? 3  -MJ 3  -LR     Standing up from a chair using your arms (e.g., wheelchair, bedside chair)? 3  -MJ 3  -LR     Climbing 3-5 steps with a railing? 3  -MJ 3  -LR     To walk in hospital room? 3  -MJ 3  -LR     AM-PAC 6 Clicks Score 20  -MJ 18  -LR     Functional Assessment    Outcome Measure Options AM-PAC 6 Clicks Basic Mobility (PT)  -MJ AM-PAC 6 Clicks Basic Mobility (PT)  -LR       User Key  (r) = Recorded By, (t) = Taken By, (c) = Cosigned By    Initials Name Provider Type    LR Becca Enrique, PT Physical Therapist    JJ Herrera, PT Physical Therapist           Time Calculation:         PT Charges       09/12/17 1106          Time Calculation    Start Time 1005  -MJ      PT Received On 09/12/17  -MJ      PT Goal Re-Cert Due Date 09/21/17  -MJ      Time Calculation- PT    Total Timed Code Minutes- PT 38 minute(s)  -        User Key  (r) = Recorded By, (t) = Taken By, (c) = Cosigned By    Initials Name  Provider Type    MJ Billy Herrera PT Physical Therapist          Therapy Charges for Today     Code Description Service Date Service Provider Modifiers Qty    03827866795 HC GAIT TRAINING EA 15 MIN 2017 Billy Herrera PT GP 1    65646770492 HC PT THER PROC EA 15 MIN 2017 Billy Herrera PT GP 2          PT G-Codes  Outcome Measure Options: AM-PAC 6 Clicks Basic Mobility (PT)    PT Discharge Summary  Reason for Discharge: Discharge from facility  Outcomes Achieved: Patient able to partially acheive established goals  Discharge Destination: Home with assist, Home with home health    Billy Herrera PT  2017            Electronically signed by Billy Herrera PT at 2017 11:07 AM           Discharge Summary      Darcie RUBI George at 2017  9:31 AM          Patient Name: Ezekiel Reyes  MRN: [id number]  : 1965  DOS: 2017    Attending: Julio Sawyer MD    Primary Care Provider: Julio Sawyer MD    Date of Admission:.2017  7:35 AM    Date of Discharge:  2017    Discharge Diagnosis: Principal Problem:    Status post revision total replacement of right hip  Active Problems:    Pain due to total hip replacement    GERD (gastroesophageal reflux disease)    Prediabetes    Tobacco abuse      Hospital Course  Patient is a 52 y.o. male presented for revision of right total hip arthroplasty by Dr. Sawyer under [place]. He tolerated surgery well and was admitted for further medical management.     Per 's note:   ( 52-year-old with long history of right hip issues. He describes avascular necrosis treated with core decompression approximately 10 years ago. This was complicated by intertrochanteric fracture and nail fixation. Several years ago this was removed and a right total hip replacement was placed.  There is overall improvement in his symptoms but he continues to have popping sensation and pain in the right hip. X-rays show possible osteolysis proximally around the fully coated stem.  Metal on metal acetabular liner is in place. There is no gross osteolytic process. Risks and benefits indications and rationale for revision total hip arthroplasty including any variation of acetabular liner exchange and acetabular or femoral component revision discussed with patient to wishes to proceed with surgery for pain relief. He signs his own consent after all questions are answered.)    The patient has done well postop. The patient has been able to ambulate 500 feet with PT.  The patient has had good pain control with PO pain medications.   The patient was placed on DVT prophylaxis including aspirin. The patient was encouraged to use IS for atelectasis prophylaxis.   The patient was placed on a bowel regimen to prevent constipation while on pain medication.   The patient's H/H was monitored with a slight decrease that remained asymptomatic.    It is felt by all involved that the patient can discharge home at this time, and the patient has no further questions      Procedures Performed  9/11/2017     Pre-op Diagnosis:   Right hip painful total hip arthroplasty     Post-op Diagnosis:     Right hip painful total hip arthroplasty  Heterotopic ossification     Procedure/CPT® Codes:  44401 - Exchange to Polyethylene liner  Local excision heterotopic ossification     Procedure(s):  TOTAL HIP ARTHROPLASTY ANTERIOR REVISON RIGHT      Surgeon(s):  Julio Sawyer MD       Pertinent Test Results:    I reviewed the patient's new clinical results.     Results from last 7 days  Lab Units 09/12/17  0856   WBC 10*3/mm3 5.58   HEMOGLOBIN g/dL 13.8   HEMATOCRIT % 41.9   PLATELETS 10*3/mm3 129*       Results from last 7 days  Lab Units 09/12/17  0856   SODIUM mmol/L 137   POTASSIUM mmol/L 4.0   CHLORIDE mmol/L 106   CO2 mmol/L 29.0   BUN mg/dL 9   CREATININE mg/dL 0.70   CALCIUM mg/dL 8.7   GLUCOSE mg/dL 188*   Results for SHEY SAUCEDO (MRN [id number]) as of 9/12/2017 11:03   Ref. Range 9/11/2017 22:13 9/12/2017 07:47  "2017 08:56   Glucose Latest Ref Range: 70 - 100 mg/dL 161 (H) 127 188 (H)     I reviewed the patient's new imaging including images and reports.      Physical therapy: Patient ambulated 500 feet with RW and CGA, only required SBA for t/f and bed mobility. Plan is d/c home tomorrow. Will progress mobility and strength as able, progress to stair training in AM.     Discharge Assessment:    Vital Signs  /78  Pulse 83  Temp 98.3 °F (36.8 °C) (Oral)   Resp 16  Ht 64\" (162.6 cm)  Wt 190 lb (86.2 kg)  SpO2 96%  BMI 32.61 kg/m2  Temp (24hrs), Av.2 °F (36.8 °C), Min:96.8 °F (36 °C), Max:99.6 °F (37.6 °C)      General Appearance:    Alert, cooperative, in no acute distress   Lungs:     Clear to auscultation,respirations regular, even and                   unlabored    Heart:    Regular rhythm and normal rate, normal S1 and S2   Abdomen:     Normal bowel sounds, no masses, no organomegaly, soft        non-tender, non-distended, no guarding, no rebound                 tenderness   Extremities:   Moves all extremities well, no edema, no cyanosis, no              Redness.Long lateral right hip incision, CDI with prineo on   Pulses:   Pulses palpable and equal bilaterally   Skin:   No bleeding, bruising or rash   Neurologic:   Cranial nerves 2 - 12 grossly intact, sensation intact. Flexion and dorsiflexion intact bilateral feet.       Discharge Disposition: Home    Discharge Medications   Ezekiel Reyes   Home Medication Instructions LEELA:[id number]    Printed on:17 1024   Medication Information                      aspirin  MG EC tablet  Take 1 tablet by mouth Daily. For 1 month             docusate sodium 100 MG capsule  Take 100 mg by mouth 2 (Two) Times a Day.             oxyCODONE-acetaminophen (PERCOCET) 5-325 MG per tablet  Take 1 tablet by mouth Every 4 (Four) Hours As Needed for Moderate Pain  for up to 9 days.             pantoprazole (PROTONIX) 20 MG EC tablet  Take 20 mg by mouth " Daily.                 Discharge Diet: Consistent carb diet    Activity at Discharge: ambulate    Follow-up Appointments  Dr. Sawyer per his orders    Discharge took over 30 min.    RUBI Wilson  09/12/17  10:24 AM     Electronically signed by RUBI Wilson at 9/12/2017 11:05 AM

## 2017-09-12 NOTE — PLAN OF CARE
Problem: Patient Care Overview (Adult)  Goal: Plan of Care Review  Outcome: Ongoing (interventions implemented as appropriate)    09/12/17 1103   Coping/Psychosocial Response Interventions   Plan Of Care Reviewed With patient   Patient Care Overview   Progress improving   Outcome Evaluation   Outcome Summary/Follow up Plan Pt ambulated 450 feet with SBA and RW and progressed to stair training this date. Pt anticipates discharge home today with HHPT, made good progress toward goals during inpatient stay         Problem: Inpatient Physical Therapy  Goal: Bed Mobility Goal LTG- PT  Outcome: Outcome(s) achieved Date Met:  09/12/17 09/11/17 1620 09/12/17 1103   Bed Mobility PT LTG   Bed Mobility PT LTG, Date Established 09/11/17 --    Bed Mobility PT LTG, Time to Achieve 5 days --    Bed Mobility PT LTG, Activity Type supine to sit/sit to supine --    Bed Mobility PT LTG, [place] Level conditional independence --    Bed Mobility PT LTG, Date Goal Reviewed --  09/12/17   Bed Mobility PT LTG, Outcome --  goal met       Goal: Transfer Training Goal 1 LTG- PT  Outcome: Unable to achieve outcome(s) by discharge Date Met:  09/12/17 09/11/17 1620 09/12/17 1103   Transfer Training PT LTG   Transfer Training PT LTG, Date Established 09/11/17 --    Transfer Training PT LTG, Time to Achieve 5 days --    Transfer Training PT LTG, Activity Type sit to stand/stand to sit --    Transfer Training PT LTG, [place] Level conditional independence --    Transfer Training PT LTG, Assist Device walker, rolling --    Transfer Training PT LTG, Date Goal Reviewed --  09/12/17   Transfer Training PT LTG, Outcome --  goal not met   Transfer Training PT LTG, Reason Goal Not Met --  discharged from facility       Goal: Gait Training Goal LTG- PT  Outcome: Unable to achieve outcome(s) by discharge Date Met:  09/12/17 09/11/17 1620 09/12/17 1103   Gait Training PT LTG   Gait Training Goal PT LTG, Date Established 09/11/17 --    Gait  Training Goal PT LTG, Time to Achieve 5 days --    Gait Training Goal PT LTG, [place] Level conditional independence --    Gait Training Goal PT LTG, Assist Device walker, rolling --    Gait Training Goal PT LTG, Distance to Achieve 750 feet --    Gait Training Goal PT LTG, Date Goal Reviewed --  09/12/17   Gait Training Goal PT LTG, Outcome --  goal not met   Gait Training Goal PT LTG, Reason Goal Not Met --  discharged from facility       Goal: Stair Training Goal STG- PT  Outcome: Unable to achieve outcome(s) by discharge Date Met:  09/12/17 09/11/17 1620 09/12/17 1103   Stair Training PT STG   Stair Training Goal PT STG, Date Established 09/11/17 --    Stair Training Goal PT STG, Time to Achieve 5 days --    Stair Training Goal PT STG, Number of Steps 1 --    Stair Training Goal PT STG, [place] Level conditional independence --    Stair Training Goal PT STG, Assist Device walker, rolling  (backwards) --    Stair Training Goal PT STG, Date Goal Reviewed --  09/12/17   Stair Training Goal PT STG, Outcome --  goal not met   Stair Training Goal PT STG, Reason Goal Not Met --  discharged from facility       Goal: Stair Training Goal LTG- PT  Outcome: Unable to achieve outcome(s) by discharge Date Met:  09/12/17 09/11/17 1620 09/12/17 1103   Stair Training PT LTG   Stair Training Goal PT LTG, Date Established 09/11/17 --    Stair Training Goal PT LTG, Time to Achieve 5 days --    Stair Training Goal PT LTG, Number of Steps 3 --    Stair Training Goal PT LTG, [place] Level contact guard assist --    Stair Training Goal PT LTG, Assist Device 1 handrail;cane, straight --    Stair Training Goal PT LTG, Date Goal Reviewed --  09/12/17   Stair Training Goal PT LTG, Outcome --  goal not met   Stair Training Goal PT LTG, Reason Goal Not Met --  discharged from facility

## 2017-09-12 NOTE — PLAN OF CARE
Problem: Patient Care Overview (Adult)  Goal: Plan of Care Review  Outcome: Outcome(s) achieved Date Met:  09/12/17 09/12/17 1106   Coping/Psychosocial Response Interventions   Plan Of Care Reviewed With patient;spouse   Patient Care Overview   Progress improving   Outcome Evaluation   Outcome Summary/Follow up Plan Educated pt/spouse on RLE THP, incorporation into bed mobility/transfer training and ADL retraining. Reviewed AE use and pt plant to borrow AE items from family member. Recommend DC home with HHOT, assist from family, BSC, RW and tub bench which pt plans to borrow from family member.          Problem: Hip Replacement, Total (Adult)  Goal: Signs and Symptoms of Listed Potential Problems Will be Absent or Manageable (Hip Replacement, Total)  Outcome: Outcome(s) achieved Date Met:  09/12/17 09/12/17 1106   Hip Replacement, Total   Problems Assessed (Total Hip Replacement) functional decline/self care deficit   Problems Present (Total Hip Replacement) functional decline/self care deficit         Problem: Inpatient Occupational Therapy  Goal: Transfer Training Goal 1 LTG- OT  Outcome: Outcome(s) achieved Date Met:  09/12/17 09/12/17 1106   Transfer Training OT LTG   Transfer Training OT LTG, Date Established 09/12/17   Transfer Training OT LTG, Time to Achieve by discharge   Transfer Training OT LTG, Activity Type sit to stand/stand to sit;tub   Transfer Training OT LTG, [place] Level verbal cues required;contact guard assist   Transfer Training OT LTG, Assist Device walker, rolling;tub bench   Transfer Training OT LTG, Outcome goal met       Goal: Patient Education Goal LTG- OT  Outcome: Outcome(s) achieved Date Met:  09/12/17 09/12/17 1106   Patient Education OT LTG   Patient Education OT LTG, Date Established 09/12/17   Patient Education OT LTG, Time to Achieve by discharge   Patient Education OT LTG, Education Type precautions per surgeon;1 hand/jean technique;home safety;adaptive  equipment mgmt   Patient Education OT LTG, Education Understanding demonstrates adequately;verbalizes understanding   Patient Education OT LTG Outcome goal met       Goal: LB Dressing Goal LTG- OT  Outcome: Outcome(s) achieved Date Met:  09/12/17 09/12/17 1106   LB Dressing OT LTG   LB Dressing Goal OT LTG, Date Established 09/12/17   LB Dressing Goal OT LTG, Time to Achieve by discharge   LB Dressing Goal OT LTG, Activity Type Pt will complee LB dressing task while maintaining RLE THP   LB Dressing Goal OT LTG, [place] Level verbal cues required;supervision required   LB Dressing Goal OT LTG, Adaptive Equipment sock-aid   LB Dressing Goal OT LTG, Outcome goal met

## 2017-09-12 NOTE — DISCHARGE SUMMARY
Patient Name: Ezekiel Reyes  MRN: [id number]  : 1965  DOS: 2017    Attending: Cipriano Davalos MD    Primary Care Provider: Julio Sawyer MD    Date of Admission:.2017  7:35 AM    Date of Discharge:  2017    Discharge Diagnosis: Principal Problem:    Status post revision total replacement of right hip  Active Problems:    Pain due to total hip replacement    GERD (gastroesophageal reflux disease)    Prediabetes    Tobacco abuse      Hospital Course  Patient is a 52 y.o. male presented for revision of right total hip arthroplasty by Dr. Sawyer under GA. He tolerated surgery well and was admitted for further medical management.     Per 's note:   ( 52-year-old with long history of right hip issues. He describes avascular necrosis treated with core decompression approximately 10 years ago. This was complicated by intertrochanteric fracture and nail fixation. Several years ago this was removed and a right total hip replacement was placed.  There is overall improvement in his symptoms but he continues to have popping sensation and pain in the right hip. X-rays show possible osteolysis proximally around the fully coated stem. Metal on metal acetabular liner is in place. There is no gross osteolytic process. Risks and benefits indications and rationale for revision total hip arthroplasty including any variation of acetabular liner exchange and acetabular or femoral component revision discussed with patient to wishes to proceed with surgery for pain relief. He signs his own consent after all questions are answered.)    The patient has done well postop. The patient has been able to ambulate 500 feet with PT.  The patient has had good pain control with PO pain medications.   The patient was placed on DVT prophylaxis including aspirin. The patient was encouraged to use IS for atelectasis prophylaxis.   The patient was placed on a bowel regimen to prevent constipation while on pain medication.    The patient's H/H was monitored with a slight decrease that remained asymptomatic.    It is felt by all involved that the patient can discharge home at this time, and the patient has no further questions      Procedures Performed  9/11/2017     Pre-op Diagnosis:   Right hip painful total hip arthroplasty     Post-op Diagnosis:     Right hip painful total hip arthroplasty  Heterotopic ossification     Procedure/CPT® Codes:  90029 - Exchange to Polyethylene liner  Local excision heterotopic ossification     Procedure(s):  TOTAL HIP ARTHROPLASTY ANTERIOR REVISON RIGHT       Findings: H.O. External capsule resected                        No gross instability or obvious inflammation                        Soft tissue between acetabular implant and liner sent for culture                        Stable acetabular and femoral components     Complications: None     Implants:                        Out:               Depuy [place] 54/36 +2/neutral liner Metal-on-metal                        In:                  Depuy [place] 54/36 neutral/neutral polyethylene liner                        Replaced: +1.5/36 neck/head adapter                        Maintained:              Stable [place] acetabular shell and AML stem      Surgeon(s):  Julio Sawyer MD       Pertinent Test Results:    I reviewed the patient's new clinical results.     Results from last 7 days  Lab Units 09/12/17  0856   WBC 10*3/mm3 5.58   HEMOGLOBIN g/dL 13.8   HEMATOCRIT % 41.9   PLATELETS 10*3/mm3 129*       Results from last 7 days  Lab Units 09/12/17  0856   SODIUM mmol/L 137   POTASSIUM mmol/L 4.0   CHLORIDE mmol/L 106   CO2 mmol/L 29.0   BUN mg/dL 9   CREATININE mg/dL 0.70   CALCIUM mg/dL 8.7   GLUCOSE mg/dL 188*   Results for SHEY SAUCEDO (MRN [id number]) as of 9/12/2017 11:03   Ref. Range 9/11/2017 22:13 9/12/2017 07:47 9/12/2017 08:56   Glucose Latest Ref Range: 70 - 100 mg/dL 161 (H) 127 188 (H)     I reviewed the patient's new imaging including  "images and reports.      Physical therapy: Patient ambulated 500 feet with RW and CGA, only required SBA for t/f and bed mobility. Plan is d/c home tomorrow. Will progress mobility and strength as able, progress to stair training in AM.     Discharge Assessment:    Vital Signs  /78  Pulse 83  Temp 98.3 °F (36.8 °C) (Oral)   Resp 16  Ht 64\" (162.6 cm)  Wt 190 lb (86.2 kg)  SpO2 96%  BMI 32.61 kg/m2  Temp (24hrs), Av.3 °F (36.8 °C), Min:96.8 °F (36 °C), Max:99.6 °F (37.6 °C)      General Appearance:    Alert, cooperative, in no acute distress   Lungs:     Clear to auscultation,respirations regular, even and                   unlabored    Heart:    Regular rhythm and normal rate, normal S1 and S2   Abdomen:     Normal bowel sounds, no masses, no organomegaly, soft        non-tender, non-distended, no guarding, no rebound                 tenderness   Extremities:   Moves all extremities well, no edema, no cyanosis, no              Redness.Long lateral right hip incision, CDI with prineo on   Pulses:   Pulses palpable and equal bilaterally   Skin:   No bleeding, bruising or rash   Neurologic:   Cranial nerves 2 - 12 grossly intact, sensation intact. Flexion and dorsiflexion intact bilateral feet.       Discharge Disposition: Home    Discharge Medications   Ezekiel Reyes   Home Medication Instructions LEELA:[id number]    Printed on:17 1223   Medication Information                      aspirin  MG EC tablet  Take 1 tablet by mouth Daily. For 1 month             docusate sodium 100 MG capsule  Take 100 mg by mouth 2 (Two) Times a Day.             oxyCODONE-acetaminophen (PERCOCET) 5-325 MG per tablet  Take 1 tablet by mouth Every 4 (Four) Hours As Needed for Moderate Pain  for up to 9 days.             pantoprazole (PROTONIX) 20 MG EC tablet  Take 20 mg by mouth Daily.                 Discharge Diet: Consistent carb diet    Activity at Discharge: ambulate    Follow-up Appointments  Dr. Sawyer per " his orders    Discharge took over 30 min.  Seen and examined by me. Agree with above. Discussed with patient. He will not take Lortab while using Percocet for postoperative pain.   He will follow with PCP.    Cipriano Davalos MD  09/12/17  12:23 PM

## 2017-09-12 NOTE — PROGRESS NOTES
Discharge Planning Assessment  [facility]     Patient Name: Ezekiel Reyes  MRN: [id number]  Today's Date: 9/12/2017    Admit Date: 9/11/2017          Discharge Needs Assessment       09/12/17 0923    Living Environment    Transportation Available car    Living Environment    Provides Primary Care For no one    Quality Of Family Relationships supportive;helpful;involved    Able to Return to Prior Living Arrangements yes    Living Arrangement Comments Lives with wife and teenage son in a ranch with basement in [place] [facility].    Discharge Needs Assessment    Concerns To Be Addressed discharge planning concerns    Readmission Within The Last 30 Days no previous admission in last 30 days    Outpatient/Agency/Support Group Needs homecare agency (specify level of care)    Anticipated Changes Related to Illness inability to care for self    Equipment Needed After Discharge commode;walker, rolling    Discharge Facility/Level Of Care Needs home with home health            Discharge Plan       09/12/17 0925    Case Management/Social Work Plan    Plan Home with HHPT    Additional Comments Spoke with patient at bedside.  He lives with his wife and teenage son in a ranch with basement home in [place].  They can assist him at home.  He has been independent with all ADLs prior to surgery.  Denies DME.  Denies HH.  Patient has Rx drug coverage and uses [facility] pharmacy in [place] [facility].  Patient's goal is to return home with wife & son and he prefers [facility] in [place] [facility].  PT has recommened a front wheeled walker and beside commode which I have ordered throug [employer] and both will be delivered to bedside prior to discharge.  I have called referral to Elizabeth with [facility] @ [phone].  CM will follow.          Discharge Placement     No information found        Expected Discharge Date and Time     Expected Discharge Date Expected Discharge Time    Sep 13, 2017               Demographic Summary       09/12/17 0989     Referral Information    Admission Type inpatient    Referral Source physician    Reason For Consult discharge planning    Record Reviewed history and physical;medical record    Contact Information    Permission Granted to Share Information With     Primary Care Physician Information    Name patient just obtained new PCP in [place] but does not remember his name            Functional Status     None            Psychosocial     None            Abuse/Neglect     None            Legal     None            Substance Abuse     None            Patient Forms     None          Lexie Gomes

## 2017-09-12 NOTE — PLAN OF CARE
Problem: Patient Care Overview (Adult)  Goal: Plan of Care Review    09/12/17 1034   Coping/Psychosocial Response Interventions   Plan Of Care Reviewed With patient   Patient Care Overview   Progress improving

## 2017-09-12 NOTE — PLAN OF CARE
Problem: Patient Care Overview (Adult)  Goal: Plan of Care Review  Outcome: Ongoing (interventions implemented as appropriate)    Problem: Perioperative Period (Adult)  Goal: Signs and Symptoms of Listed Potential Problems Will be Absent or Manageable (Perioperative Period)  Outcome: Ongoing (interventions implemented as appropriate)    Problem: Fall Risk (Adult)  Goal: Identify Related Risk Factors and Signs and Symptoms  Outcome: Ongoing (interventions implemented as appropriate)    09/12/17 0446   Fall Risk   Fall Risk: Related Risk Factors gait/mobility problems;fatigue/slow reaction

## 2017-09-15 LAB
BACTERIA SPEC AEROBE CULT: NORMAL
GRAM STN SPEC: NORMAL

## 2017-09-21 NOTE — PROGRESS NOTES
Continued Stay Note  [facility]     Patient Name: Ezekiel Reyes  MRN: [id number]  Today's Date: 9/21/2017    Admit Date: 9/11/2017          Discharge Plan       09/21/17 0905    Case Management/Social Work Plan    Plan  update    Additional Comments Faxed signed order/face to face to Lexie cervantes [facility] to [phone]. CM will follow.               Discharge Codes     None        Expected Discharge Date and Time     Expected Discharge Date Expected Discharge Time    Sep 12, 2017             Jil Mary

## 2017-09-26 LAB — BACTERIA SPEC ANAEROBE CULT: NORMAL

## 2017-10-23 LAB
FUNGUS WND CULT: NORMAL
MYCOBACTERIUM SPEC CULT: NORMAL
NIGHT BLUE STAIN TISS: NORMAL

## 2018-02-08 ENCOUNTER — LAB (OUTPATIENT)
Dept: LAB | Facility: HOSPITAL | Age: 53
End: 2018-02-08

## 2018-02-08 ENCOUNTER — TRANSCRIBE ORDERS (OUTPATIENT)
Dept: LAB | Facility: HOSPITAL | Age: 53
End: 2018-02-08

## 2018-02-08 DIAGNOSIS — T84.84XS PAIN DUE TO HIP JOINT PROSTHESIS, SEQUELA: ICD-10-CM

## 2018-02-08 DIAGNOSIS — Z96.649 PAIN DUE TO HIP JOINT PROSTHESIS, SEQUELA: Primary | ICD-10-CM

## 2018-02-08 DIAGNOSIS — Z96.649 PAIN DUE TO HIP JOINT PROSTHESIS, SEQUELA: ICD-10-CM

## 2018-02-08 DIAGNOSIS — T84.84XS PAIN DUE TO HIP JOINT PROSTHESIS, SEQUELA: Primary | ICD-10-CM

## 2018-02-08 LAB
ALBUMIN SERPL-MCNC: 4.2 G/DL (ref 3.2–4.8)
ALBUMIN/GLOB SERPL: 1.2 G/DL (ref 1.5–2.5)
ALP SERPL-CCNC: 329 U/L (ref 25–100)
ALT SERPL W P-5'-P-CCNC: 159 U/L (ref 7–40)
ANION GAP SERPL CALCULATED.3IONS-SCNC: 9 MMOL/L (ref 3–11)
AST SERPL-CCNC: 151 U/L (ref 0–33)
BASOPHILS # BLD AUTO: 0.04 10*3/MM3 (ref 0–0.2)
BASOPHILS NFR BLD AUTO: 0.7 % (ref 0–1)
BILIRUB SERPL-MCNC: 0.5 MG/DL (ref 0.3–1.2)
BUN BLD-MCNC: 15 MG/DL (ref 9–23)
BUN/CREAT SERPL: 16.7 (ref 7–25)
CALCIUM SPEC-SCNC: 9.4 MG/DL (ref 8.7–10.4)
CHLORIDE SERPL-SCNC: 101 MMOL/L (ref 99–109)
CO2 SERPL-SCNC: 28 MMOL/L (ref 20–31)
CREAT BLD-MCNC: 0.9 MG/DL (ref 0.6–1.3)
CRP SERPL-MCNC: 0.46 MG/DL (ref 0–1)
DEPRECATED RDW RBC AUTO: 50.7 FL (ref 37–54)
EOSINOPHIL # BLD AUTO: 0.22 10*3/MM3 (ref 0–0.3)
EOSINOPHIL NFR BLD AUTO: 3.8 % (ref 0–3)
ERYTHROCYTE [DISTWIDTH] IN BLOOD BY AUTOMATED COUNT: 13.8 % (ref 11.3–14.5)
ERYTHROCYTE [SEDIMENTATION RATE] IN BLOOD: 39 MM/HR (ref 0–20)
GFR SERPL CREATININE-BSD FRML MDRD: 107 ML/MIN/1.73
GFR SERPL CREATININE-BSD FRML MDRD: 88 ML/MIN/1.73
GLOBULIN UR ELPH-MCNC: 3.5 GM/DL
GLUCOSE BLD-MCNC: 135 MG/DL (ref 70–100)
HCT VFR BLD AUTO: 45.9 % (ref 38.9–50.9)
HGB BLD-MCNC: 15.2 G/DL (ref 13.1–17.5)
IMM GRANULOCYTES # BLD: 0.01 10*3/MM3 (ref 0–0.03)
IMM GRANULOCYTES NFR BLD: 0.2 % (ref 0–0.6)
LYMPHOCYTES # BLD AUTO: 2.47 10*3/MM3 (ref 0.6–4.8)
LYMPHOCYTES NFR BLD AUTO: 42.7 % (ref 24–44)
MCH RBC QN AUTO: 33.1 PG (ref 27–31)
MCHC RBC AUTO-ENTMCNC: 33.1 G/DL (ref 32–36)
MCV RBC AUTO: 100 FL (ref 80–99)
MONOCYTES # BLD AUTO: 0.59 10*3/MM3 (ref 0–1)
MONOCYTES NFR BLD AUTO: 10.2 % (ref 0–12)
NEUTROPHILS # BLD AUTO: 2.46 10*3/MM3 (ref 1.5–8.3)
NEUTROPHILS NFR BLD AUTO: 42.4 % (ref 41–71)
PLATELET # BLD AUTO: 187 10*3/MM3 (ref 150–450)
PMV BLD AUTO: 12.6 FL (ref 6–12)
POTASSIUM BLD-SCNC: 4.4 MMOL/L (ref 3.5–5.5)
PROT SERPL-MCNC: 7.7 G/DL (ref 5.7–8.2)
RBC # BLD AUTO: 4.59 10*6/MM3 (ref 4.2–5.76)
SODIUM BLD-SCNC: 138 MMOL/L (ref 132–146)
WBC NRBC COR # BLD: 5.79 10*3/MM3 (ref 3.5–10.8)

## 2018-02-08 PROCEDURE — 36415 COLL VENOUS BLD VENIPUNCTURE: CPT

## 2018-02-08 PROCEDURE — 86140 C-REACTIVE PROTEIN: CPT

## 2018-02-08 PROCEDURE — 80053 COMPREHEN METABOLIC PANEL: CPT

## 2018-02-08 PROCEDURE — 85025 COMPLETE CBC W/AUTO DIFF WBC: CPT

## 2018-02-08 PROCEDURE — 85652 RBC SED RATE AUTOMATED: CPT

## (undated) DEVICE — ANTIBACTERIAL UNDYED BRAIDED (POLYGLACTIN 910), SYNTHETIC ABSORBABLE SUTURE: Brand: COATED VICRYL

## (undated) DEVICE — SYS SKIN CLS DERMABOND PRINEO W/22CM MESH TP

## (undated) DEVICE — INTENDED USE FOR SURGICAL MARKING ON INTACT SKIN, ALSO PROVIDES A PERMANENT METHOD OF IDENTIFYING OBJECTS IN THE OPERATING ROOM: Brand: WRITESITE® REGULAR TIP SKIN MARKER

## (undated) DEVICE — SYR LUERLOK 50ML

## (undated) DEVICE — SPK10295 ORTHOPEDIC FRACTURE KIT: Brand: SPK10295 ORTHOPEDIC FRACTURE KIT

## (undated) DEVICE — COVER,LIGHT HANDLE,FLX,1/PK: Brand: MEDLINE INDUSTRIES, INC.

## (undated) DEVICE — BNDG ELAS CO-FLEX SLF ADHR 4IN5YD LF STRL

## (undated) DEVICE — SOL POVIDONE IODINE 10PCT 3/4OZ STRL

## (undated) DEVICE — ADAPT ST INFUS ADMIN SYR 70IN

## (undated) DEVICE — ENCORE® LATEX MICRO SIZE 8, STERILE LATEX POWDER-FREE SURGICAL GLOVE: Brand: ENCORE

## (undated) DEVICE — PK MAJ TOTL HIP ANT 10

## (undated) DEVICE — GLV SURG SIGNATURE TOUCH PF LTX 8 STRL BX/50

## (undated) DEVICE — CANN NASL CO2 DIVIDED A/